# Patient Record
Sex: MALE | Race: WHITE | Employment: FULL TIME | URBAN - METROPOLITAN AREA
[De-identification: names, ages, dates, MRNs, and addresses within clinical notes are randomized per-mention and may not be internally consistent; named-entity substitution may affect disease eponyms.]

---

## 2020-01-31 ENCOUNTER — HOSPITAL ENCOUNTER (OUTPATIENT)
Dept: LAB | Age: 50
Discharge: HOME | End: 2020-01-31
Payer: COMMERCIAL

## 2020-01-31 LAB
ANION GAP SERPL CALC-SCNC: 5 MMOL/L (ref 3–18)
BUN SERPL-MCNC: 11 MG/DL (ref 7–18)
BUN/CREAT SERPL: 11 (ref 12–20)
CALCIUM SERPL-MCNC: 8.8 MG/DL (ref 8.5–10.1)
CHLORIDE SERPL-SCNC: 107 MMOL/L (ref 100–111)
CO2 SERPL-SCNC: 27 MMOL/L (ref 21–32)
CREAT SERPL-MCNC: 0.98 MG/DL (ref 0.6–1.3)
ERYTHROCYTE [DISTWIDTH] IN BLOOD BY AUTOMATED COUNT: 12.7 % (ref 11.6–14.5)
GLUCOSE SERPL-MCNC: 105 MG/DL (ref 74–99)
HCT VFR BLD AUTO: 45.7 % (ref 36–48)
HGB BLD-MCNC: 16.4 G/DL (ref 13–16)
MCH RBC QN AUTO: 34.7 PG (ref 24–34)
MCHC RBC AUTO-ENTMCNC: 35.9 G/DL (ref 31–37)
MCV RBC AUTO: 96.6 FL (ref 74–97)
PLATELET # BLD AUTO: 175 K/UL (ref 135–420)
PMV BLD AUTO: 10.5 FL (ref 9.2–11.8)
POTASSIUM SERPL-SCNC: 4.1 MMOL/L (ref 3.5–5.5)
RBC # BLD AUTO: 4.73 M/UL (ref 4.7–5.5)
SODIUM SERPL-SCNC: 139 MMOL/L (ref 136–145)
WBC # BLD AUTO: 6 K/UL (ref 4.6–13.2)

## 2020-01-31 PROCEDURE — 36415 COLL VENOUS BLD VENIPUNCTURE: CPT

## 2020-01-31 PROCEDURE — 85027 COMPLETE CBC AUTOMATED: CPT

## 2020-01-31 PROCEDURE — 80048 BASIC METABOLIC PNL TOTAL CA: CPT

## 2020-02-03 ENCOUNTER — HOSPITAL ENCOUNTER (OUTPATIENT)
Dept: LAB | Age: 50
Discharge: HOME | End: 2020-02-03
Payer: COMMERCIAL

## 2020-02-03 PROCEDURE — 88305 TISSUE EXAM BY PATHOLOGIST: CPT

## 2020-02-03 PROCEDURE — 88331 PATH CONSLTJ SURG 1 BLK 1SPC: CPT

## 2020-09-29 ENCOUNTER — OFFICE VISIT (OUTPATIENT)
Dept: ORTHOPEDIC SURGERY | Age: 50
End: 2020-09-29
Payer: COMMERCIAL

## 2020-09-29 ENCOUNTER — HOSPITAL ENCOUNTER (OUTPATIENT)
Dept: LAB | Age: 50
Discharge: HOME | End: 2020-09-29
Payer: COMMERCIAL

## 2020-09-29 VITALS
OXYGEN SATURATION: 99 % | SYSTOLIC BLOOD PRESSURE: 158 MMHG | HEART RATE: 84 BPM | HEIGHT: 74 IN | TEMPERATURE: 97.3 F | BODY MASS INDEX: 30.36 KG/M2 | WEIGHT: 236.6 LBS | DIASTOLIC BLOOD PRESSURE: 94 MMHG

## 2020-09-29 DIAGNOSIS — S91.332A PENETRATING WOUND OF LEFT FOOT, INITIAL ENCOUNTER: Primary | ICD-10-CM

## 2020-09-29 DIAGNOSIS — S91.332A PENETRATING WOUND OF LEFT FOOT, INITIAL ENCOUNTER: ICD-10-CM

## 2020-09-29 DIAGNOSIS — M79.672 BILATERAL FOOT PAIN: ICD-10-CM

## 2020-09-29 DIAGNOSIS — M79.671 BILATERAL FOOT PAIN: ICD-10-CM

## 2020-09-29 PROCEDURE — 87077 CULTURE AEROBIC IDENTIFY: CPT

## 2020-09-29 PROCEDURE — 73630 X-RAY EXAM OF FOOT: CPT | Performed by: ORTHOPAEDIC SURGERY

## 2020-09-29 PROCEDURE — 99213 OFFICE O/P EST LOW 20 MIN: CPT | Performed by: ORTHOPAEDIC SURGERY

## 2020-09-29 PROCEDURE — 87186 SC STD MICRODIL/AGAR DIL: CPT

## 2020-09-29 PROCEDURE — 11055 PARING/CUTG B9 HYPRKER LES 1: CPT | Performed by: ORTHOPAEDIC SURGERY

## 2020-09-29 PROCEDURE — 87147 CULTURE TYPE IMMUNOLOGIC: CPT

## 2020-09-29 PROCEDURE — 87205 SMEAR GRAM STAIN: CPT

## 2020-09-29 RX ORDER — IBUPROFEN 200 MG
TABLET ORAL
COMMUNITY
End: 2020-11-16

## 2020-09-29 RX ORDER — CIPROFLOXACIN 500 MG/1
500 TABLET ORAL 2 TIMES DAILY
Qty: 20 TAB | Refills: 0 | Status: SHIPPED | OUTPATIENT
Start: 2020-09-29 | End: 2020-10-09

## 2020-09-29 NOTE — LETTER
NOTIFICATION RETURN TO WORK / SCHOOL 
 
9/29/2020 2:58 PM 
 
Mr. Holli Antonio [facility] [id number] To Whom It May Concern: 
 
Holli Antonio is currently under the care of [address] [place] [address] Bryan Jules. He will be out of work until follow up visit in one week. If there are questions or concerns please have the patient contact our office.  
 
 
 
Sincerely, 
 
 
Neeraj Kunz MD

## 2020-09-29 NOTE — PATIENT INSTRUCTIONS
You have been provided with an order for durable medical equipment that you may  at an outside facility as our office does not carry the equipment you need. You may pick it up at any medical supply company you like. Listed below are a few different locations for your convenience: [facility] [address] [place] [address] Street Phone: [phone] Dermal Spray  Instructions for Use: Spray 2-3 times, blot affected area, then repeat. Telfa 4x4 dressings Ace wraps

## 2020-09-29 NOTE — PROGRESS NOTES
AMBULATORY PROGRESS NOTE      Patient: Marc Darden             MRN: [id number]     SSN: [id number] Body mass index is 30.38 kg/m². YOB: 1970     AGE: 44 y.o. EX: male    PCP: None       IMPRESSION //  DIAGNOSIS AND TREATMENT PLAN      DIAGNOSES    1. Penetrating wound of left foot, initial encounter    2. Bilateral foot pain        Orders Placed This Encounter    SHAV SKIN LES 6-10MM TRUNK,ARM,LEG    Generic Supply Order     hard sole shoe, left foot    Generic Supply Order     telfa  Dermal spray  Sterile 4X4    CULTURE, BODY FLUID W GRAM STAIN     Standing Status:   Future     Standing Expiration Date:   9/30/2021     Order Specific Question:   Tube Number:     Answer:   1    [21214] Foot Min 3V     Order Specific Question:   Weight bearing? Answer:   No    [41905] Foot Min 3V     Order Specific Question:   Weight bearing? Answer:   No    ibuprofen (AdviL) 200 mg tablet     Sig: Take  by mouth.  ciprofloxacin HCl (Cipro) 500 mg tablet     Sig: Take 1 Tab by mouth two (2) times a day for 10 days. Dispense:  20 Tab     Refill:  0          Plan is to do a gentle shaving procedure, to this wound that he has the left great toe. Cultures to be obtained as well    He does have a wound to the left great toe that measures 1 cm x 0.8 cm is on the plantar portion of the left great toe involves the epidermal layer. I see no pus. This was gently shaved, after obtaining written consent    Additional plans aside as below    3 views of the left foot, AP lateral and oblique x-rays of the left foot, shows postop changes from a left great toe IP joint disarticulation amputation surgery see no osteolytic or blastic lesions to suggest infection, and the bone, the left great toe appear to see no gas in the soft tissues of this left great toe. Three-view right foot: Tender over the right great toe IP joint seen metatarsus adductus, no acute fracture subluxation dislocations.   No osteolytic or blastic lesions seen on this three-view right foot. PLAN:    1. Patient received wound care treatment in the office. Telfa 4x4 dressings were placed. Also obtained culture. 2. DME order: hard sole shoe, left foot  3. Work Note: Please allow Mr. Marie Jimenez to remain out of work. He is currently under my care for wound care treatment of his great toe and will need to wear hard sole shoe for protection. 4. Rx for Dermal Spray, Telfa 4x4 dressings, and ace wraps at S  5. Antibiotic: Ciprofloxacin 5 mg 1 p.o. twice daily for 10 days. RTO-      HPI //  OBJECTIVE EXAMINATION      Peterson Wong IS A 44 y.o. male who presents to my outpatient office for evaluation of: left great toe pain. The patient reports that [place] when he noticed a wound at the amputation site. He admits to [place] extensive time in ocean [place], he attributes this most recent wound to being in the water while at the beach. He reports having difficulty with walking due to severe pain in his toe. The patient denies h/o of diabetes. This was that he developed, several weeks ago. He meets a spending extensive time the ocean water. Denies hitting his foot on anything,. He does notice a wound that developed. So sounds like may develop from a blister, but denies stepping on any clamshells or any foreign bodies. The patient works for [employer].      Visit Vitals  BP (!) 158/94   Pulse 84   Temp 97.3 °F (36.3 °C) (Temporal)   Ht 6' 2\" (1.88 m)   Wt 236 lb 9.6 oz (107.3 kg)   SpO2 99%   BMI 30.38 kg/m²       ANKLE/FOOT left    Psychiatry: Alert, oriented x 3 (name,place,time of day); speech normal in context and clarity, memory intact grossly, no involuntary movements - tremors, no dementia  Gait: normal  Tenderness: mild great toe distal phalanx  Cutaneous: elliptical wound to bottom of foot (0.8cm x 1 cm); denuded skin  Joint Motion: WNL  Joint / Tendon Stability: No Ankle or Subtalar instability or joint laxity. No peroneal sublux ability or dislocation  Alignment: neutral Hindfoot, none Metatarsus Adductus Metatarsus. Neuro Motor/Sensory: NL/NL,  Vascular: NL foot/ankle pulses,   Lymphatics: No extremity lymphedema, No calf swelling, no tenderness to calf muscles. CHART REVIEW     Patient Active Problem List   Diagnosis Code    Open wound of foot except toe(s) alone, without mention of complication N10.306M    Osteomyelitis (Rachell Ply) M86.9    Great toe amputation status [id number]        Barry Haas has been experiencing pain and discomfort confirmed as outlined in the pain assessment outlined below. Pain Assessment  9/29/2020   Location of Pain Foot   Pain Location Comment -   Location Modifiers Right;Left   Severity of Pain 6   Quality of Pain Sharp; Aching   Duration of Pain Persistent   Frequency of Pain Constant   Date Pain First Started (No Data)   Aggravating Factors Walking   Limiting Behavior Yes   Relieving Factors NSAID   Relieving Factors Comment -   Result of Injury No   Work-Related Injury -   Type of Injury -        Barry Haas  has no past medical history on file. Patients is employed at:         History reviewed. No pertinent past medical history. History reviewed. No pertinent surgical history. Current Outpatient Medications   Medication Sig    ciprofloxacin HCl (Cipro) 500 mg tablet Take 1 Tab by mouth two (2) times a day for 10 days.  ibuprofen (AdviL) 200 mg tablet Take  by mouth.  HYDROcodone-acetaminophen (NORCO) 5-325 mg per tablet Take 1-2 Tabs by mouth every eight (8) hours as needed for Pain. Max Daily Amount: 6 Tabs.  silver-foam bandage (AQUACEL AG FOAM) 1.2 %- 3.2\" X 3.2\" bndg 1 Each by Apply Externally route daily.  ondansetron (ZOFRAN ODT) 4 mg disintegrating tablet Take 1 Tab by mouth every eight (8) hours as needed for Nausea. No current facility-administered medications for this visit. Allergies   Allergen Reactions    Bactrim [Sulfamethoprim Ds] Rash     Social History     Occupational History    Not on file   Tobacco Use    Smoking status: Current Some Day Smoker    Smokeless tobacco: Never Used   Substance and Sexual Activity    Alcohol use: Yes    Drug use: No    Sexual activity: Not on file     Family History   Problem Relation Age of Onset    Cancer Father     Diabetes Brother     Heart Disease Other     Hypertension Neg Hx     Stroke Neg Hx        THE  FOR Krystyna Drake  WAS REVIEWED BY Marian Mcdowell MD 9/29/2020 . DIAGNOSTIC IMAGING  LAB DATA      No results found for: HBA1C, HGBE8, OMM1IXNS, TWV8EKVF //   Lab Results   Component Value Date/Time    Glucose 105 (H) 01/31/2020 11:52 AM        No results found for: FHP7UMFD, ERY8CNBJ      No results found for: VITD3, XQVID2, XQVID3, XQVID, VD3RIA, KTVF58DTWBE      REVIEW OF SYSTEMS : 9/29/2020  ALL BELOW ARE Negative except : SEE HPI     CONSTITUTIONAL: No weight loss  PSYCHOLOGICAL : No Feelings of anxiety, depression, agitation  EYES: No blurred vision and no eye discharge. NO eye pain, double vision  ENT: No nasal discharge. No ear pain. CARDIOVASCULAR: No chest pain and no diaphoresis. RESPIRATORY: No cough, no hemoptysis. GI: No vomiting, no diarrhea   : No urinary frequency and no dysuria. MUSCULOSKELETAL: see HPI  SKIN: No rashes. NEURO:  No dizziness,weakness, headaches// No visual changes or confusion, or seizures,   ENDOCRINE: No polyphagia and no polydipsia. HEMATOLOGY: No bleeding tendencies. DIAGNOSTIC IMAGING        3 views of the left foot, AP lateral and oblique x-rays of the left foot, shows postop changes from a left great toe IP joint disarticulation amputation surgery see no osteolytic or blastic lesions to suggest infection, and the bone, the left great toe appear to see no gas in the soft tissues of this left great toe.     Three-view right foot: Tender over the right great toe IP joint seen metatarsus adductus, no acute fracture subluxation dislocations. No osteolytic or blastic lesions seen on this three-view right foot. Please see above section of this report. I have personally reviewed the results of the above study. The interpretation of this study is my professional opinion. PROCEDURE: CALLOUS SHAVE NOTE          Will Trivedi provided verbal consent for a callous shaving procedure for 9/29/2020. The procedure was explained to the patient and possible adverse reactions were discussed. Risks and Benefits explained to the patient:included, but not limited to: bleeding, infection, local skin irritation. Patient and/or family questions answered      TIME OUT DONE (YES)    * Procedure site verified and marked as necessary  * Patient was positioned for comfort  * Verbal Informed Consent Verified by myself and my office staff. * TIME OUT performed immediately prior to start of procedure:    Peripheral skin callus: Callous ( 1 cm X .8 cm)  was carefully removed and trimmed with a sharp 15 blade from the:  Left foot -toe -plantar distal aspect without any complications. Betadine was placed, and cleansed of this year and a sharp excisional debridement of the dermal tissue using a sharp 15 blade. This was then cleansed with Betadine, and a dry dressing was placed: Telfa 4 x 4 Ace wrap: He is given supplies:    Cultures were obtained, prior to any cleaning and prepping procedure, of this wound    Will Trivedi tolerated the procedure well and was advised on the signs of infection and instructed to go to the ER or call the office if Will Trivedi becomes concerned about the area being infected. Patient received MIKE (After visit instructions).         Yudi Rae MD  9/29/2020  12:54 PM

## 2020-10-02 LAB
BACTERIA SPEC CULT: ABNORMAL
GRAM STN SPEC: ABNORMAL
SERVICE CMNT-IMP: ABNORMAL

## 2020-11-11 ENCOUNTER — TELEPHONE (OUTPATIENT)
Dept: ORTHOPEDIC SURGERY | Age: 50
End: 2020-11-11

## 2020-11-11 ENCOUNTER — HOSPITAL ENCOUNTER (OUTPATIENT)
Dept: LAB | Age: 50
Discharge: HOME | End: 2020-11-11
Payer: COMMERCIAL

## 2020-11-11 ENCOUNTER — OFFICE VISIT (OUTPATIENT)
Dept: ORTHOPEDIC SURGERY | Age: 50
End: 2020-11-11
Payer: COMMERCIAL

## 2020-11-11 VITALS
HEART RATE: 94 BPM | HEIGHT: 74 IN | SYSTOLIC BLOOD PRESSURE: 145 MMHG | TEMPERATURE: 97.3 F | WEIGHT: 231 LBS | RESPIRATION RATE: 16 BRPM | OXYGEN SATURATION: 99 % | BODY MASS INDEX: 29.65 KG/M2 | DIASTOLIC BLOOD PRESSURE: 99 MMHG

## 2020-11-11 DIAGNOSIS — S91.332A PENETRATING WOUND OF LEFT FOOT, INITIAL ENCOUNTER: ICD-10-CM

## 2020-11-11 DIAGNOSIS — S91.109A OPEN WOUND OF TOE, INITIAL ENCOUNTER: ICD-10-CM

## 2020-11-11 DIAGNOSIS — S91.332A PENETRATING WOUND OF LEFT FOOT, INITIAL ENCOUNTER: Primary | ICD-10-CM

## 2020-11-11 LAB
BASOPHILS # BLD: 0 K/UL (ref 0–0.1)
BASOPHILS NFR BLD: 0 % (ref 0–2)
CRP SERPL-MCNC: 6.3 MG/DL (ref 0–0.3)
DIFFERENTIAL METHOD BLD: ABNORMAL
EOSINOPHIL # BLD: 0.1 K/UL (ref 0–0.4)
EOSINOPHIL NFR BLD: 1 % (ref 0–5)
ERYTHROCYTE [DISTWIDTH] IN BLOOD BY AUTOMATED COUNT: 12.5 % (ref 11.6–14.5)
ERYTHROCYTE [SEDIMENTATION RATE] IN BLOOD: 28 MM/HR (ref 0–20)
HCT VFR BLD AUTO: 46.2 % (ref 36–48)
HGB BLD-MCNC: 16.6 G/DL (ref 13–16)
INR PPP: 1 (ref 0.8–1.2)
LYMPHOCYTES # BLD: 1.7 K/UL (ref 0.9–3.6)
LYMPHOCYTES NFR BLD: 26 % (ref 21–52)
MCH RBC QN AUTO: 35.6 PG (ref 24–34)
MCHC RBC AUTO-ENTMCNC: 35.9 G/DL (ref 31–37)
MCV RBC AUTO: 99.1 FL (ref 74–97)
MONOCYTES # BLD: 0.8 K/UL (ref 0.05–1.2)
MONOCYTES NFR BLD: 12 % (ref 3–10)
NEUTS SEG # BLD: 4.1 K/UL (ref 1.8–8)
NEUTS SEG NFR BLD: 61 % (ref 40–73)
PLATELET # BLD AUTO: 204 K/UL (ref 135–420)
PMV BLD AUTO: 10.3 FL (ref 9.2–11.8)
PROTHROMBIN TIME: 13.4 SEC (ref 11.5–15.2)
RBC # BLD AUTO: 4.66 M/UL (ref 4.7–5.5)
WBC # BLD AUTO: 6.6 K/UL (ref 4.6–13.2)

## 2020-11-11 PROCEDURE — 99214 OFFICE O/P EST MOD 30 MIN: CPT | Performed by: ORTHOPAEDIC SURGERY

## 2020-11-11 PROCEDURE — 85652 RBC SED RATE AUTOMATED: CPT

## 2020-11-11 PROCEDURE — 85610 PROTHROMBIN TIME: CPT

## 2020-11-11 PROCEDURE — 85025 COMPLETE CBC W/AUTO DIFF WBC: CPT

## 2020-11-11 PROCEDURE — 36415 COLL VENOUS BLD VENIPUNCTURE: CPT

## 2020-11-11 PROCEDURE — 86140 C-REACTIVE PROTEIN: CPT

## 2020-11-11 RX ORDER — CIPROFLOXACIN 750 MG/1
750 TABLET, FILM COATED ORAL 2 TIMES DAILY
Qty: 14 TAB | Refills: 1 | Status: SHIPPED | OUTPATIENT
Start: 2020-11-11 | End: 2020-11-20

## 2020-11-11 NOTE — PROGRESS NOTES
AMBULATORY PROGRESS NOTE      Patient: Lynda Vaughn             MRN: [id number]     SSN: [id number] Body mass index is 29.66 kg/m². YOB: 1970     AGE: 44 y.o. EX: male    PCP: None       IMPRESSION //  DIAGNOSIS AND TREATMENT PLAN      DIAGNOSES  1. Penetrating wound of left foot, initial encounter        Orders Placed This Encounter    Generic Supply Order     CAM walker boot    MRI FOOT LT WO CONT     Standing Status:   Future     Standing Expiration Date:   2/11/2021     Order Specific Question:   Region of foot     Answer: Fore    C REACTIVE PROTEIN, QT     Standing Status:   Future     Standing Expiration Date:   11/12/2021    CBC WITH AUTOMATED DIFF     Standing Status:   Future     Standing Expiration Date:   11/12/2021    SED RATE (ESR)     Standing Status:   Future     Standing Expiration Date:   11/12/2021    PROTHROMBIN TIME + INR     PT WITH INR     Standing Status:   Future     Standing Expiration Date:   11/18/2020        The patient the influenza, that he was not able to cough much time from work, and has been standing walking and working and had taken a bandage of his foot, to continue working. It is to be recalled, that the patient has a wound to the plantar aspect of his great toe, distal end of the proximal phalanx, where he is to be noted he is at his toe amputation at the distal phalanx IP region in the past.    He had cultures, of this wound, at the time when I saw him on 10/2/2020, is a superficial wound. And the cultures, grew back gram-positive cocci moderate Streptococcus, all sensitive to ciprofloxacin, which for which she is remained on. Today, he returns today, with a circular wound, the plantar portion of the great toe, that still has not healed. He states that he has not been able to stay off of it, and is what he needs to and tells me.     He states a hard soled shoe I gave him, was not he was unable to wear this comfortably so he did not wear it.    Evaluation today, he is a circular wound there is no pus there is some small dark particles, which I debrided, this was thoroughly cleansed with dermal spray. There is no pus, this was not cultured. Because of the long presence of this wound, and is poor healing, recommendation, is for stat MRI of the forefoot to see there is any signs of a concomitant osteomyelitis. I will keep him on the ciprofloxacin,. He understand that he may require surgical invention, if the end prolong antibiotics, if the MRI suggest osteomyelitis. Also to have a infection disease, see him on consultation as well. Heel pressure weightbearing, keep the foot as clean as as he can. PLAN:    1. Patient received wound care treatment in the office. Telfa 4x4 dressings were placed. Also obtained culture. 2. L foot MRI: r/o infection. Penetrating wound  3. Anticipate surgery to clean wound out if healing does not occur  4. Continue use of Antibiotic  5. DME order: CAM walker boot  6. Antibiotic: Ciprofloxacin 500 mg 1 p.o. twice daily for 10 days. 7.  Saline solution was given to him, he will keep this covered: Clean it once a day with saline, blotted dry with a sterile 4 x 4, place sterile 4 x 4's Ace wrap, to cover the great toe keep the toe clean and dry. Thereafter. RTO- after MRI      HPI //  OBJECTIVE EXAMINATION        Yaron Stern IS A 44 y.o. male who presents to my outpatient office for evaluation of: bilateral foot pain. Pt was last evaluated 9/2020 for penetrating wound of L foot and bilateral foot pain. Received wound care in the office. Telfa 4x4 dressings were placed. Also obtained culture. DME order: hard sole shoe, left foot. Given work note, Rx for Dermal spray and antibiotic. Pt states that he is unable to get his wound to heal and is nervous about what is going on.  He communicates that he continues to wear a wrapping around the wound and believes that is one reason it has not begun to heal. Pt reports not having any benefit with hard sole shoe given at last OV. He works at [employer]. Visit Vitals  BP (!) 145/99 (BP 1 Location: Left arm, BP Patient Position: Sitting)   Pulse 94   Temp 97.3 °F (36.3 °C)   Resp 16   Ht 6' 2\" (1.88 m)   Wt 231 lb (104.8 kg)   SpO2 99%   BMI 29.66 kg/m²       ANKLE/FOOT left    Psychiatry: Alert, oriented x 3 (name,place,time of day); speech normal in context and clarity, memory intact grossly, no involuntary movements - tremors, no dementia  Gait: normal  Tenderness: mild distal aspect plantar distal aspect for which there is a circular wound, that measures about 1 cm x 1 cm in width, and is about 6 to 8 mm in depth. See no exposed bone at this current time. Cutaneous: See above  Joint Motion: Decreased motion at the MTP region. Joint / Tendon Stability: No Ankle or Subtalar instability or joint laxity. No peroneal sublux ability or dislocation  Alignment: neutral Hindfoot  Neuro Motor/Sensory: NL/diminished in station light touch  Vascular: NL foot/ankle pulses,   Lymphatics: No extremity lymphedema, No calf swelling, no tenderness to calf muscles. CHART REVIEW     Patient Active Problem List   Diagnosis Code    Open wound of foot except toe(s) alone, without mention of complication X73.184V    Osteomyelitis ([facility] [address] M86.9    Great toe amputation status [id number]        Hailey Jeong has been experiencing pain and discomfort confirmed as outlined in the pain assessment outlined below.       Pain Assessment  11/11/2020   Location of Pain Toe   Pain Location Comment -   Location Modifiers Right   Severity of Pain 8   Quality of Pain Sharp   Duration of Pain Persistent   Frequency of Pain Constant   Date Pain First Started 9/1/2020   Aggravating Factors Walking;Standing   Limiting Behavior Yes   Relieving Factors NSAID   Relieving Factors Comment -   Result of Injury No   Work-Related Injury -   Type of Injury - Nataliia Douglass  has no past medical history on file. Patients is employed at:         History reviewed. No pertinent past medical history. History reviewed. No pertinent surgical history. Current Outpatient Medications   Medication Sig    ibuprofen (AdviL) 200 mg tablet Take  by mouth.  silver-foam bandage (AQUACEL AG FOAM) 1.2 %- 3.2\" X 3.2\" bndg 1 Each by Apply Externally route daily.  HYDROcodone-acetaminophen (NORCO) 5-325 mg per tablet Take 1-2 Tabs by mouth every eight (8) hours as needed for Pain. Max Daily Amount: 6 Tabs.  ondansetron (ZOFRAN ODT) 4 mg disintegrating tablet Take 1 Tab by mouth every eight (8) hours as needed for Nausea. No current facility-administered medications for this visit. Allergies   Allergen Reactions    Bactrim [Sulfamethoprim Ds] Rash     Social History     Occupational History    Not on file   Tobacco Use    Smoking status: Current Some Day Smoker    Smokeless tobacco: Never Used   Substance and Sexual Activity    Alcohol use: Yes    Drug use: No    Sexual activity: Not on file     Family History   Problem Relation Age of Onset    Cancer Father     Diabetes Brother     Heart Disease Other     Hypertension Neg Hx     Stroke Neg Hx        THE  FOR Nataliia Douglass  WAS REVIEWED BY Blaire Clay MD 11/11/2020 . DIAGNOSTIC IMAGING  LAB DATA      No results found for: HBA1C, HGBE8, OJX9ZOLP, ETH0GOHD //   Lab Results   Component Value Date/Time    Glucose 105 (H) 01/31/2020 11:52 AM        No results found for: FBR0VZAM, WAL0QOIF      No results found for: VITD3, XQVID2, XQVID3, XQVID, VD3RIA, WUFQ13CZDPV      REVIEW OF SYSTEMS : 11/11/2020  ALL BELOW ARE Negative except : SEE HPI     CONSTITUTIONAL: No weight loss  PSYCHOLOGICAL : No Feelings of anxiety, depression, agitation  EYES: No blurred vision and no eye discharge. NO eye pain, double vision  ENT: No nasal discharge. No ear pain. CARDIOVASCULAR: No chest pain and no diaphoresis. RESPIRATORY: No cough, no hemoptysis. GI: No vomiting, no diarrhea   : No urinary frequency and no dysuria. MUSCULOSKELETAL: see HPI  SKIN: No rashes. NEURO:  No dizziness,weakness, headaches// No visual changes or confusion, or seizures,   ENDOCRINE: No polyphagia and no polydipsia. HEMATOLOGY: No bleeding tendencies. DIAGNOSTIC IMAGING      Please see above section of this report. I have personally reviewed the results of the above study. The interpretation of this study is my professional opinion.       Keshia Ahuja MD  11/11/2020  8:08 AM

## 2020-11-11 NOTE — TELEPHONE ENCOUNTER
I called patient to advise him that his STAT MRI of left foot was scheduled at [facility] on 11/12/20 arrival of 6:15am test at 6:30am. Patient declined that appt as he has to work. Patient was then advised that THE [facility] had 2 for tomorrow 10:30am or 2:30pm and [facility]/ [facility] loc did not have anything for 2 weeks. Patient then stated \"I work until 3pm and cant do these appts. \" then  \"I  thought things were already in the works and that I wouldn't have to wait that long for an MRI, I have been here for 2 hrs waiting on bloodwork\" I again advised him that the [facility] appt was the first available for all  locations. Patient continued to say he couldn't do these so he was then offered to get sent to [facility] or [address] [place] [address]. He stated that was fine to see what they have. I am faxing patients MRI order, demographics and office note to [facility] [phone]  Tel# [phone]  Requesting an appt by Friday with wet read so he can be seen back with Dr Sergio Argueta on Monday 11/16/20.

## 2020-11-12 ENCOUNTER — HOSPITAL ENCOUNTER (OUTPATIENT)
Age: 50
Discharge: HOME | End: 2020-11-12
Attending: ORTHOPAEDIC SURGERY
Payer: COMMERCIAL

## 2020-11-12 PROCEDURE — 73718 MRI LOWER EXTREMITY W/O DYE: CPT

## 2020-11-12 NOTE — TELEPHONE ENCOUNTER
Dr. London Saravia contacted the patient at the work number provided. He informed the patient of his lab findings and advised the patient that he will need to have the MRI completed as soon as possible as Dr. London Saravia is trying to save his toe. Patient needs to have the MRI completed prior to his next office visit. He informed the patient he should not be working at this time or walking on the foot. Patient is willing to have the MRI completed. Please call the patient to reschedule the MRI. Citlalli Goodson [facility], MPA, PA-C  11/12/2020  11:19 AM

## 2020-11-12 NOTE — TELEPHONE ENCOUNTER
I called patient on his work number advised him that he is schedule for 11/12/20 @ [facility] must arrive at 8:00pm through the ED as it is after 5pm. Test begins at 8:30pm. No metal, jewelry bring Id, ins card, mask. Patient verbally stated that he understood.

## 2020-11-12 NOTE — TELEPHONE ENCOUNTER
Please see earlier response in this encounter:    Patient reports Lissa called to schedule his STAT MRI for 6 a.m. this morning, but he declined this MRI as well, due to employment obligations. Patient has declined several MRI appts for one reason or the other. Patient reports he's had his labs done at [facility]. Pt has a f/u for Monday for labs and mri review. Please advise the patient if this appointment should remain or be rescheduled.     Patient can be contacted at his work# at [phone]

## 2020-11-13 ENCOUNTER — DOCUMENTATION ONLY (OUTPATIENT)
Dept: ORTHOPEDIC SURGERY | Age: 50
End: 2020-11-13

## 2020-11-13 DIAGNOSIS — S91.109A OPEN WOUND OF TOE, INITIAL ENCOUNTER: Primary | ICD-10-CM

## 2020-11-13 DIAGNOSIS — M86.072 ACUTE HEMATOGENOUS OSTEOMYELITIS OF LEFT FOOT (HCC): ICD-10-CM

## 2020-11-13 NOTE — PROGRESS NOTES
I spoke with him re his MRI. MRI Results (most recent):  Results from Orders Only encounter on 11/11/20   MRI FOOT LT WO CONT    Narrative EXAM: MRI of the Left  foot     INDICATION: Right toe wound    TECHNIQUE: Multiplanar multisequence MR imaging of the left  foot    IV Contrast: None    COMPARISON: None    FINDINGS:   Osseous Structures: There is a plantar-based ulcer in the distal aspect of the  residual right first toe. There is a osteomyelitis of the majority of the distal  half of the proximal phalanx of the first toe. The distal phalanx previously had  been resected. The remainder the osseous structures are unremarkable. No  evidence of septic arthritis. Sesamoids are present and unremarkable. Ligaments: No gross ligamentous pathology appreciated. Tendons/Muscle: Likely denervation changes with atrophy of the foot consistent  with diabetes. Soft Tissues/Other:  As mentioned previously, there is an ulcer in the plantar  aspect of the first toe. No discrete fluid collection identified. Impression IMPRESSION:   1. Osteomyelitis of the first proximal phalanx. He will need amputation of the LEFT great toe MTP region. Partial and Complete.     SURGERY PLANNED    Mayola Soulier  1970  [id number]    Today's date: 11/13/2020       PLAN FOR SURGERY NEXT WEEK    Diagnosis: OSTEOMYELITIS LEFT GREAT TOE DISTAL END PROXIMAL PHALANX  Procedure:  AMPUTATION LEFT GREAT TOE PROXIMAL PHALANX  Location: [facility]  Pre op labs:    CMP: No results found for: NA, K, CL, CO2, AGAP, GLU, BUN, CREA, GFRAA, GFRNA, CA, MG, PHOS, ALB, TBIL, TP, ALB, GLOB, AGRAT, ALT  CBC: No results found for: WBC, HGB, HGBEXT, HCT, HCTEXT, PLT, PLTEXT, HGBEXT, HCTEXT, PLTEXT  COAGS: No results found for: APTT, PTP, INR, INREXT, INREXT   Anesthesia: GENERAL  Patient position:  SUPINE  Clearance is required: NONE  Orthopedic equipment:  KNIFE/SAW BLADE  Additional instrumentation: Levi Bucio MD  11/13/2020  5:36 PM

## 2020-11-13 NOTE — PROGRESS NOTES
Nerissa Hutchinson   [id number]  [id number]    Diagnosis: OSTEOMYELITIS LEFT GREAT TOE DISTAL END PROXIMAL PHALANX  Procedure:  AMPUTATION LEFT GREAT TOE PROXIMAL PHALANX  Location: [facility]  Pre op labs:   LABS WERE ALREADY DONE     Anesthesia: GENERAL  Patient position:  SUPINE  Clearance is required: NONE  Orthopedic equipment:  KNIFE/SAW BLADE  Additional instrumentation: Levi Oliver MD  11/13/2020  5:42 PM

## 2020-11-16 ENCOUNTER — OFFICE VISIT (OUTPATIENT)
Dept: ORTHOPEDIC SURGERY | Age: 50
End: 2020-11-16
Payer: COMMERCIAL

## 2020-11-16 ENCOUNTER — ANESTHESIA EVENT (OUTPATIENT)
Dept: SURGERY | Age: 50
DRG: 504 | End: 2020-11-16
Payer: COMMERCIAL

## 2020-11-16 ENCOUNTER — HOSPITAL ENCOUNTER (OUTPATIENT)
Dept: LAB | Age: 50
Discharge: HOME | DRG: 504 | End: 2020-11-16
Payer: COMMERCIAL

## 2020-11-16 ENCOUNTER — HOSPITAL ENCOUNTER (OUTPATIENT)
Dept: GENERAL RADIOLOGY | Age: 50
Discharge: HOME | DRG: 504 | End: 2020-11-16
Payer: COMMERCIAL

## 2020-11-16 VITALS
DIASTOLIC BLOOD PRESSURE: 99 MMHG | WEIGHT: 230 LBS | RESPIRATION RATE: 16 BRPM | OXYGEN SATURATION: 99 % | TEMPERATURE: 97.3 F | HEIGHT: 74 IN | BODY MASS INDEX: 29.52 KG/M2 | HEART RATE: 84 BPM | SYSTOLIC BLOOD PRESSURE: 154 MMHG

## 2020-11-16 DIAGNOSIS — Z01.818 PRE-OPERATIVE EXAMINATION: ICD-10-CM

## 2020-11-16 DIAGNOSIS — M86.672 OTHER CHRONIC OSTEOMYELITIS OF LEFT FOOT (HCC): ICD-10-CM

## 2020-11-16 DIAGNOSIS — M86.672 OTHER CHRONIC OSTEOMYELITIS OF LEFT FOOT (HCC): Primary | ICD-10-CM

## 2020-11-16 LAB
ALBUMIN SERPL-MCNC: 3.5 G/DL (ref 3.4–5)
ALBUMIN/GLOB SERPL: 0.9 {RATIO} (ref 0.8–1.7)
ALP SERPL-CCNC: 102 U/L (ref 45–117)
ALT SERPL-CCNC: 38 U/L (ref 16–61)
ANION GAP SERPL CALC-SCNC: 9 MMOL/L (ref 3–18)
AST SERPL-CCNC: 25 U/L (ref 10–38)
ATRIAL RATE: 84 BPM
BILIRUB SERPL-MCNC: 1.1 MG/DL (ref 0.2–1)
BUN SERPL-MCNC: 13 MG/DL (ref 7–18)
BUN/CREAT SERPL: 12 (ref 12–20)
CALCIUM SERPL-MCNC: 8.8 MG/DL (ref 8.5–10.1)
CALCULATED P AXIS, ECG09: 32 DEGREES
CALCULATED R AXIS, ECG10: -7 DEGREES
CALCULATED T AXIS, ECG11: 30 DEGREES
CHLORIDE SERPL-SCNC: 105 MMOL/L (ref 100–111)
CO2 SERPL-SCNC: 26 MMOL/L (ref 21–32)
CREAT SERPL-MCNC: 1.08 MG/DL (ref 0.6–1.3)
DIAGNOSIS, 93000: NORMAL
EST. AVERAGE GLUCOSE BLD GHB EST-MCNC: 97 MG/DL
GLOBULIN SER CALC-MCNC: 4 G/DL (ref 2–4)
GLUCOSE SERPL-MCNC: 128 MG/DL (ref 74–99)
HBA1C MFR BLD: 5 % (ref 4.2–5.6)
P-R INTERVAL, ECG05: 152 MS
POTASSIUM SERPL-SCNC: 4 MMOL/L (ref 3.5–5.5)
PROT SERPL-MCNC: 7.5 G/DL (ref 6.4–8.2)
Q-T INTERVAL, ECG07: 366 MS
QRS DURATION, ECG06: 86 MS
QTC CALCULATION (BEZET), ECG08: 432 MS
SODIUM SERPL-SCNC: 140 MMOL/L (ref 136–145)
VENTRICULAR RATE, ECG03: 84 BPM

## 2020-11-16 PROCEDURE — 93005 ELECTROCARDIOGRAM TRACING: CPT

## 2020-11-16 PROCEDURE — 71046 X-RAY EXAM CHEST 2 VIEWS: CPT

## 2020-11-16 PROCEDURE — 36415 COLL VENOUS BLD VENIPUNCTURE: CPT

## 2020-11-16 PROCEDURE — 99214 OFFICE O/P EST MOD 30 MIN: CPT | Performed by: ORTHOPAEDIC SURGERY

## 2020-11-16 PROCEDURE — 87635 SARS-COV-2 COVID-19 AMP PRB: CPT

## 2020-11-16 PROCEDURE — 80053 COMPREHEN METABOLIC PANEL: CPT

## 2020-11-16 PROCEDURE — 83036 HEMOGLOBIN GLYCOSYLATED A1C: CPT

## 2020-11-16 RX ORDER — POLYETHYLENE GLYCOL 3350 17 G/17G
17 POWDER, FOR SOLUTION ORAL DAILY
Qty: 10 PACKET | Refills: 1 | Status: SHIPPED | OUTPATIENT
Start: 2020-11-16

## 2020-11-16 RX ORDER — HYDROCODONE BITARTRATE AND ACETAMINOPHEN 7.5; 325 MG/1; MG/1
1-2 TABLET ORAL
Qty: 42 TAB | Refills: 0 | Status: SHIPPED | OUTPATIENT
Start: 2020-11-16 | End: 2020-11-23

## 2020-11-16 RX ORDER — ASPIRIN 325 MG
325 TABLET ORAL
Qty: 60 TAB | Refills: 1 | Status: SHIPPED | OUTPATIENT
Start: 2020-11-16

## 2020-11-16 RX ORDER — ONDANSETRON 4 MG/1
4 TABLET, ORALLY DISINTEGRATING ORAL
Qty: 30 TAB | Refills: 0 | Status: SHIPPED | OUTPATIENT
Start: 2020-11-16

## 2020-11-16 NOTE — PROGRESS NOTES
AMBULATORY PROGRESS NOTE      Patient: Liliana Briceno             MRN: [id number]     SSN: [id number] Body mass index is 29.53 kg/m². YOB: 1970     AGE: 44 y.o. EX: male    PCP: None       IMPRESSION //  DIAGNOSIS AND TREATMENT PLAN      DIAGNOSES  1. Other chronic osteomyelitis of left foot ([facility] [address]    2. Pre-operative examination        Orders Placed This Encounter    XR CHEST PA LAT     Standing Status:   Future     Number of Occurrences:   1     Standing Expiration Date:   5/21/2021     Scheduling Instructions:      Please recheck to confirm if:      1. Patient has Allergry to IV contrast dye      2. Patient is pregnant     Order Specific Question:   Reason for Exam     Answer:   Preop Risk stratificatiion    NOVEL CORONAVIRUS (COVID-19)     Standing Status:   Future     Number of Occurrences:   1     Standing Expiration Date:   11/16/2021     Scheduling Instructions:      1) Due to current limited availability of the COVID-19 PCR test, tests will be prioritized and may not be completed.              2) Order only if the test result will change clinical management or necessary for a return to mission-critical employment decision.              3) Print and instruct patient to adhere to CDC home isolation program. (Link Above)              4) Set up or refer patient for a monitoring program.              5) Have patient sign up for and leverage [employer] (if not previously done). Order Specific Question:   Is this test for diagnosis or screening? Answer:   Screening     Order Specific Question:   Symptomatic for COVID-19 as defined by CDC? Answer:   No     Order Specific Question:   Hospitalized for COVID-19? Answer:   No     Order Specific Question:   Admitted to ICU for COVID-19? Answer:   Unknown     Order Specific Question:   Employed in healthcare setting? Answer:   Unknown     Order Specific Question:   Resident in a congregate (group) care setting?      Answer: Unknown     Order Specific Question:   Previously tested for COVID-19? Answer:   Unknown    METABOLIC PANEL, COMPREHENSIVE     Standing Status:   Future     Number of Occurrences:   1     Standing Expiration Date:   11/17/2021    HEMOGLOBIN A1C WITH EAG     Standing Status:   Future     Number of Occurrences:   1     Standing Expiration Date:   11/17/2021    EKG, 12 LEAD, SUBSEQUENT     preop     Standing Status:   Future     Number of Occurrences:   1     Standing Expiration Date:   5/17/2021     Order Specific Question:   Reason for Exam:     Answer:   risk stratify    HYDROcodone-acetaminophen (NORCO) 7.5-325 mg per tablet     Sig: Take 1-2 Tabs by mouth every eight (8) hours as needed for Pain (AFTER SURGERY, NOT BEFORE) for up to 7 days. Max Daily Amount: 6 Tabs. Dispense:  42 Tab     Refill:  0    ondansetron (ZOFRAN ODT) 4 mg disintegrating tablet     Sig: Take 1 Tab by mouth every eight (8) hours as needed for Nausea or Vomiting. Indications: prevent nausea and vomiting after surgery     Dispense:  30 Tab     Refill:  0    aspirin (ASPIRIN) 325 mg tablet     Sig: Take 1 Tab by mouth two (2) times daily (after meals). Dispense:  60 Tab     Refill:  1    polyethylene glycol (Miralax) 17 gram packet     Sig: Take 1 Packet by mouth daily. Dispense:  10 Packet     Refill:  1        He has osteomyelitis, of the great toe, of the left great toe proximal phalanx. MRI reports this in a separate note from today. Because of his persistent wound, and inability to heal this, recommendations for amputation of left great toe really through the MTP or joint articulation site. Options would be a partial amputation of this  LEFT great toe to the proximal phalanx half of that, leaving residual bone for anticipated better balance, by little bit better biomechanics, but other wise he would probably need more prolonged antibiotics, should not leave any residual bone in this great toe phalanx.   Next chief complaint, is worsening pain in the right great toe now point to the MTP region denies any fever shakes chills night sweats. My overall plan would be for her LEFT great toe amputation again more than likely through the MTP joint, as well as this will be a MTP joint disarticulation amputation. Anticipate cultures, 23 hours observation    PLAN:     Amputation, great toe, left great toe first MTP region: Surgery planned for tomorrow, November 17, 2020      HPI //  OBJECTIVE EXAMINATION        Matilde Levin IS A 44 y.o. male who presents to my outpatient office for evaluation of:    Osteomyelitis LEFT great toe, continued pain discomfort, right great toe. its to having this wound, for several weeks now, over 6 weeks now, he states \"this is all my fault\". Next, he has continued wound, and and osteomyelitis, conditions operative intervention    Visit Vitals  BP (!) 154/99 (BP 1 Location: Right arm, BP Patient Position: Sitting)   Pulse 84   Temp 97.3 °F (36.3 °C) (Temporal)   Resp 16   Ht 6' 2\" (1.88 m)   Wt 230 lb (104.3 kg)   SpO2 99%   BMI 29.53 kg/m²       ANKLE/FOOT LEFT    Psychiatry: Alert, oriented x 3 (name,place,time of day); speech normal in context and clarity, memory intact grossly, no involuntary movements - tremors, no dementia  Gait: slow  Tenderness: moderate  LEFT great toe   Cutaneous: There are some swelling, redness to the dorsal part of the LEFT great toe, there is a wound, to the plantar lateral portion of the distal end of thisLEFT great toe, where he had this IP joint disarticulation amputation surgery many years ago  Joint Motion: Diminished range of motion, great toe MTP  Joint / Tendon Stability: No Ankle or Subtalar instability or joint laxity.                        No peroneal sublux ability or dislocation  Alignment: neutral Hindfoot  Neuro Motor/Sensory: NL/diminished incision monofilament testing, plantar forefoot  Vascular: NL foot/ankle pulses,   Lymphatics: No extremity lymphedema, No calf swelling, no tenderness to calf muscles. CHART REVIEW     Patient Active Problem List   Diagnosis Code    Open wound of foot except toe(s) alone, without mention of complication U96.137K    Osteomyelitis ([facility] [address] M86.9    Great toe amputation status [id number]        Parminder Sailnas has been experiencing pain and discomfort confirmed as outlined in the pain assessment outlined below. Pain Assessment  11/16/2020   Location of Pain Foot   Pain Location Comment -   Location Modifiers Left   Severity of Pain 8   Quality of Pain Throbbing   Quality of Pain Comment swelling   Duration of Pain Persistent   Frequency of Pain Constant   Date Pain First Started -   Aggravating Factors Stairs;Standing;Walking;Bending   Limiting Behavior -   Relieving Factors Nothing   Relieving Factors Comment -   Result of Injury No   Work-Related Injury -   Type of Injury -        Parminder Salinas  has a past medical history of Left toe amputee ([facility] [address] Patients is employed at:         Past Medical History:   Diagnosis Date    Left toe amputee [facility])      Past Surgical History:   Procedure Laterality Date    FOOT/TOES SURGERY PROC UNLISTED      HX HEENT  02/2020    Lip excision (lower)     Current Outpatient Medications   Medication Sig    HYDROcodone-acetaminophen (NORCO) 7.5-325 mg per tablet Take 1-2 Tabs by mouth every eight (8) hours as needed for Pain (AFTER SURGERY, NOT BEFORE) for up to 7 days. Max Daily Amount: 6 Tabs.  ondansetron (ZOFRAN ODT) 4 mg disintegrating tablet Take 1 Tab by mouth every eight (8) hours as needed for Nausea or Vomiting. Indications: prevent nausea and vomiting after surgery    aspirin (ASPIRIN) 325 mg tablet Take 1 Tab by mouth two (2) times daily (after meals).  polyethylene glycol (Miralax) 17 gram packet Take 1 Packet by mouth daily.  ciprofloxacin HCl (CIPRO) 750 mg tablet Take 1 Tab by mouth two (2) times a day.     silver-foam bandage (AQUACEL AG FOAM) 1.2 %- 3.2\" X 3.2\" bndg 1 Each by Apply Externally route daily. No current facility-administered medications for this visit. Allergies   Allergen Reactions    Bactrim [Sulfamethoprim Ds] Rash     Social History     Occupational History    Not on file   Tobacco Use    Smoking status: Former Smoker    Smokeless tobacco: Never Used   Substance and Sexual Activity    Alcohol use: Yes     Comment: social    Drug use: Not Currently     Types: Marijuana     Comment: last 2005    Sexual activity: Not on file     Family History   Problem Relation Age of Onset    Cancer Father     Diabetes Brother     Heart Disease Other     Hypertension Neg Hx     Stroke Neg Hx        THE  FOR Iwona Amado  WAS REVIEWED BY Radha Lindsey MD 11/16/2020 . DIAGNOSTIC IMAGING  LAB DATA      No results found for: HBA1C, HGBE8, UDY8ZNOM, XGP0ZCCX //   Lab Results   Component Value Date/Time    Glucose 105 (H) 01/31/2020 11:52 AM        No results found for: ZCL6RSPF, HVO9CMMB      No results found for: VITD3, XQVID2, XQVID3, XQVID, VD3RIA, JBOM45QZZXM      REVIEW OF SYSTEMS : 11/16/2020  ALL BELOW ARE Negative except : SEE HPI     CONSTITUTIONAL: No weight loss  PSYCHOLOGICAL : No Feelings of anxiety, depression, agitation  EYES: No blurred vision and no eye discharge. NO eye pain, double vision  ENT: No nasal discharge. No ear pain. CARDIOVASCULAR: No chest pain and no diaphoresis. RESPIRATORY: No cough, no hemoptysis. GI: No vomiting, no diarrhea   : No urinary frequency and no dysuria. MUSCULOSKELETAL: see HPI  SKIN: No rashes. NEURO:  No dizziness,weakness, headaches// No visual changes or confusion, or seizures,   ENDOCRINE: No polyphagia and no polydipsia. HEMATOLOGY: No bleeding tendencies. DIAGNOSTIC IMAGING      Please see above section of this report. I have personally reviewed the results of the above study. The interpretation of this study is my professional opinion.       Radha Lindsey MD  11/16/2020  1:16 PM

## 2020-11-16 NOTE — PROGRESS NOTES
DIAGNOSTIC IMAGING         MRI Results (most recent):  Results from Orders Only encounter on 11/11/20   MRI FOOT LT WO CONT    Narrative EXAM: MRI of the Left  foot     INDICATION: Right toe wound    TECHNIQUE: Multiplanar multisequence MR imaging of the left  foot    IV Contrast: None    COMPARISON: None    FINDINGS:   Osseous Structures: There is a plantar-based ulcer in the distal aspect of the  residual right first toe. There is a osteomyelitis of the majority of the distal  half of the proximal phalanx of the first toe. The distal phalanx previously had  been resected. The remainder the osseous structures are unremarkable. No  evidence of septic arthritis. Sesamoids are present and unremarkable. Ligaments: No gross ligamentous pathology appreciated. Tendons/Muscle: Likely denervation changes with atrophy of the foot consistent  with diabetes. Soft Tissues/Other:  As mentioned previously, there is an ulcer in the plantar  aspect of the first toe. No discrete fluid collection identified. Impression IMPRESSION:   1. Osteomyelitis of the first proximal phalanx.                           Roman English MD  11/16/2020  12:53 PM

## 2020-11-16 NOTE — H&P (VIEW-ONLY)
FOOT AND ANKLE HISTORY AND PHYSICAL Patient: Juan F Cobb                   MRN: [id number]         SSN: [id number] YOB: 1970            AGE: 44 y.o. SEX: male Patient scheduled for:  Amputation of left great toe; irrigation and debridement; cultures Date of surgery: 11/17/20 [facility]: Laurel Devries MD 
ANESTHESIA TYPE:  General  
  
    
 
 
 
 
 
ORDERS PLACED DURING H&P: 
 
Orders Placed This Encounter  XR CHEST PA LAT Standing Status:   Future Standing Expiration Date:   5/21/2021 Scheduling Instructions:  
   Please recheck to confirm if: 1. Patient has Allergry to IV contrast dye 2. Patient is pregnant Order Specific Question:   Reason for Exam  
  Answer:   Preop Risk stratificatiion  NOVEL CORONAVIRUS (COVID-19) Standing Status:   Future Standing Expiration Date:   11/16/2021 Scheduling Instructions:  
   1) Due to current limited availability of the COVID-19 PCR test, tests will be prioritized and may not be completed.    
     
   2) Order only if the test result will change clinical management or necessary for a return to mission-critical employment decision.    
     
   3) Print and instruct patient to adhere to CDC home isolation program. (Link Above)    
     
   4) Set up or refer patient for a monitoring program.    
     
   5) Have patient sign up for and leverage [employer] (if not previously done). Order Specific Question:   Is this test for diagnosis or screening? Answer:   Screening Order Specific Question:   Symptomatic for COVID-19 as defined by CDC? Answer:   No  
  Order Specific Question:   Hospitalized for COVID-19? Answer:   No  
  Order Specific Question:   Admitted to ICU for COVID-19? Answer:   Unknown Order Specific Question:   Employed in healthcare setting? Answer:   Unknown Order Specific Question:   Resident in a congregate (group) care setting? Answer:   Unknown Order Specific Question:   Previously tested for COVID-19? Answer:   Unknown  METABOLIC PANEL, COMPREHENSIVE Standing Status:   Future Standing Expiration Date:   11/17/2021  
 HEMOGLOBIN A1C WITH EAG Standing Status:   Future Standing Expiration Date:   11/17/2021  EKG, 12 LEAD, SUBSEQUENT  
  preop Standing Status:   Future Standing Expiration Date:   5/17/2021 Order Specific Question:   Reason for Exam: Answer:   risk stratify  HYDROcodone-acetaminophen (NORCO) 7.5-325 mg per tablet Sig: Take 1-2 Tabs by mouth every eight (8) hours as needed for Pain (AFTER SURGERY, NOT BEFORE) for up to 7 days. Max Daily Amount: 6 Tabs. Dispense:  42 Tab Refill:  0  
 ondansetron (ZOFRAN ODT) 4 mg disintegrating tablet Sig: Take 1 Tab by mouth every eight (8) hours as needed for Nausea or Vomiting. Indications: prevent nausea and vomiting after surgery Dispense:  30 Tab Refill:  0  
 aspirin (ASPIRIN) 325 mg tablet Sig: Take 1 Tab by mouth two (2) times daily (after meals). Dispense:  60 Tab Refill:  1  
 polyethylene glycol (Miralax) 17 gram packet Sig: Take 1 Packet by mouth daily. Dispense:  10 Packet Refill:  1 Post Operative Prescriptions have  been e-scribed during the H&P HISTORY:  
 
The patient was seen in the office today for a preoperative history and physical for an upcoming above listed surgery. The patient is a pleasant 44 y.o. male who has a history of  osteomyelitis, of the great toe, of the left great toe proximal phalanx. MRI reports this in a separate note from today.   Because of his persistent wound, and inability to heal this, recommendations for amputation of left great toe really through the MTP or joint articulation site. 
  
 Options would be a partial amputation of this  LEFT great toe to the proximal phalanx half of that, leaving residual bone for anticipated better balance, by little bit better biomechanics, but other wise he would probably need more prolonged antibiotics, should not leave any residual bone in this great toe phalanx. Next chief complaint, is worsening pain in the right great toe now point to the MTP region denies any fever shakes chills night sweats. 
  
My overall plan would be for her LEFT great toe amputation again more than likely through the MTP joint, as well as this will be a MTP joint disarticulation amputation. 
  
Due to the current findings, affected activity of daily living and continued pain and discomfort, surgical intervention is indicated. The alternatives, risks, and complications, including but not limited to infection, blood loss, need for blood transfusion, neurovascular damage, al-incisional numbness, subcutaneous hematoma, bone fracture, anesthetic complications, DVT, PE, death, RSD, postoperative stiffness and pain, possible surgical scar, delayed healing and nonhealing, reflexive sympathetic dystrophy, damage to blood vessels and nerves, need for more surgery, MI, and stroke, failure of hardware, gait disturbances  have been discussed. The patient understands and wishes to proceed with surgery. The patient was counseled at length about the risks of polo Covid-19 during their perioperative period and any recovery window from their procedure. The patient was made aware that polo Covid-19  may worsen their prognosis for recovering from their procedure and lend to a higher morbidity and/or mortality risk. All material risks, benefits, and reasonable alternatives including postponing the procedure were discussed. The patient does wish to proceed with the procedure at this time. PAST MEDICAL HISTORY:  
 
Past Medical History:  
Diagnosis Date  Left toe amputee ([facility] [address] CURRENT MEDICATIONS:  
 
Current Outpatient Medications Medication Sig Dispense Refill  
 HYDROcodone-acetaminophen (NORCO) 7.5-325 mg per tablet Take 1-2 Tabs by mouth every eight (8) hours as needed for Pain (AFTER SURGERY, NOT BEFORE) for up to 7 days. Max Daily Amount: 6 Tabs. 42 Tab 0  
 ondansetron (ZOFRAN ODT) 4 mg disintegrating tablet Take 1 Tab by mouth every eight (8) hours as needed for Nausea or Vomiting. Indications: prevent nausea and vomiting after surgery 30 Tab 0  
 aspirin (ASPIRIN) 325 mg tablet Take 1 Tab by mouth two (2) times daily (after meals). 60 Tab 1  polyethylene glycol (Miralax) 17 gram packet Take 1 Packet by mouth daily. 10 Packet 1  ciprofloxacin HCl (CIPRO) 750 mg tablet Take 1 Tab by mouth two (2) times a day. 14 Tab 1  
 silver-foam bandage (AQUACEL AG FOAM) 1.2 %- 3.2\" X 3.2\" bndg 1 Each by Apply Externally route daily. 1 Box 0 ALLERGIES:  
 
Allergies Allergen Reactions  Bactrim [Sulfamethoprim Ds] Rash SURGICAL HISTORY:  
 
Past Surgical History:  
Procedure Laterality Date  FOOT/TOES SURGERY PROC UNLISTED    
 
 
SOCIAL HISTORY:  
 
Social History Socioeconomic History  Marital status:  Spouse name: Not on file  Number of children: Not on file  Years of education: Not on file  Highest education level: Not on file Tobacco Use  Smoking status: Current Some Day Smoker  Smokeless tobacco: Never Used Substance and Sexual Activity  Alcohol use: Yes Comment: social  
 Drug use: No  
 
 
FAMILY HISTORY:  
 
Family History Problem Relation Age of Onset  Cancer Father  Diabetes Brother  Heart Disease Other  Hypertension Neg Hx  Stroke Neg Hx REVIEW OF SYSTEMS:  
 
Negative for fevers, chills, chest pain, shortness of breath, weight loss, recent illness General: Negative for fever and chills. No unexpected change in weight. Denies fatigue. No change in appetite. Skin: Negative for rash or itching. HEENT: Negative for congestion, sore throat, neck pain and neck stiffness. No change in vision or hearing. Hasn't noted any enlarged lymph nodes in the neck. Cardiovascular:  Negative for chest pain and palpitations. Has not noted pedal edema. Respiratory: Negative for cough, colds, sinus, hemoptysis, shortness of breath and wheezing. Gastrointestinal: Negative for nausea and vomiting, rectal bleeding, coffee ground emesis, abdominal pain, diarrhea and constipation. Genitourinary: Negative for dysuria, frequency urgency, or burning on micturition. No flank pain, no foul smelling urine, no difficulty with initiating urination. Hematological: Negative for bleeding or easy bruising. Musculoskeletal: Negative for arthralgias, back pain or neck pain. Neurological: Negative for dizziness, seizures or syncopal episodes. Denies headaches. Endocrine: Denies excessive thirst.  No heat/cold intolerance. Psychiatric: Negative for depression or insomnia. PHYSICAL EXAMINATION:  
 
VITALS:  
Visit Vitals BP (!) 154/99 (BP 1 Location: Right arm, BP Patient Position: Sitting) Pulse 84 Temp 97.3 °F (36.3 °C) (Temporal) Resp 16 Ht 6' 2\" (1.88 m) Wt 230 lb (104.3 kg) SpO2 99% BMI 29.53 kg/m² Pain Assessment  11/16/2020 Location of Pain Foot Pain Location Comment - Location Modifiers Left Severity of Pain 8 Quality of Pain Throbbing Quality of Pain Comment swelling Duration of Pain Persistent Frequency of Pain Constant Date Pain First Started - Aggravating Factors Stairs;Standing;Walking;Bending Limiting Behavior -  
Relieving Factors Nothing Relieving Factors Comment - Result of Injury No  
Work-Related Injury - Type of Injury -  
 
  
GEN:  Well developed, well nourished 44 y.o. male in no acute distress. PSYCH: Alert an oriented to person, place and time.  Mood, memory, affect, behavior and judgment normal. Speech normal in context and clarity. HEENT: Normocephalic and atraumatic. Eyes: Conjunctivae and EOM are normal.Pupils are equal, round, and reactive to light. External ear normal appearance, external nose normal appearing. Mouth/Throat: Oropharynx is clear and moist, able to handle oral secretions w/out difficulty, airway patent. NECK: Supple. Normal ROM, No lymphadenopathy. Trachea is midline. No bruising, swelling or deformity RESP: Clear to auscultation bilaterally. No audible wheezing from mouth. No rales, rhonchi. Normal effort and breath sounds. No respiratory use of accessory muscles during breathing or distress CHEST/ABDOMEN: Observation reveals: No audible wheezing from mouth. No accessory use of chest muscles during breathing. Non tender abdomen CARDIO:  Normal rate, regular rhythm and normal heart sounds. No MGR. ABDOMEN: Non-tender, non-distended, normoactive bowel sounds in all four quadrants. There is no tenderness. There is no rebound and no guarding. BACK: No CVA or spinal tenderness BREAST:  Deferred PELVIC:    Deferred RECTAL:  Deferred :           Deferred EXTREMITIES: EXAMINATION OF:  ANKLE/FOOT LEFT 
  
Psychiatry: Alert, oriented x 3 (name,place,time of day); speech normal in context and clarity, memory intact grossly, no involuntary movements - tremors, no dementia Gait: slow Tenderness: moderate  LEFT great toe Cutaneous: There are some swelling, redness to the dorsal part of the LEFT great toe, there is a wound, to the plantar lateral portion of the distal end of thisLEFT great toe, where he had this IP joint disarticulation amputation surgery many years ago Joint Motion: Diminished range of motion, great toe MTP Joint / Tendon Stability: No Ankle or Subtalar instability or joint laxity. No peroneal sublux ability or dislocation Alignment: neutral Hindfoot Neuro Motor/Sensory: NL/diminished incision monofilament testing, plantar forefoot Vascular: NL foot/ankle pulses, Lymphatics: No extremity lymphedema, No calf swelling, no tenderness to calf muscles. RADIOGRAPHS & DIAGNOSTIC STUDIES:  
 
MRI Results (most recent): 
Results from Orders Only encounter on 11/11/20 MRI FOOT LT WO CONT Narrative EXAM: MRI of the Left  foot INDICATION: Right toe wound TECHNIQUE: Multiplanar multisequence MR imaging of the left  foot IV Contrast: None COMPARISON: None FINDINGS:  
Osseous Structures: There is a plantar-based ulcer in the distal aspect of the 
residual right first toe. There is a osteomyelitis of the majority of the distal 
half of the proximal phalanx of the first toe. The distal phalanx previously had 
been resected. The remainder the osseous structures are unremarkable. No 
evidence of septic arthritis. Sesamoids are present and unremarkable. Ligaments: No gross ligamentous pathology appreciated. Tendons/Muscle: Likely denervation changes with atrophy of the foot consistent 
with diabetes. Soft Tissues/Other:  As mentioned previously, there is an ulcer in the plantar 
aspect of the first toe. No discrete fluid collection identified. Impression IMPRESSION:  
1. Osteomyelitis of the first proximal phalanx. LABS:  
 
 
 
No visits with results within 3 Day(s) from this visit. Latest known visit with results is:  
Hospital Outpatient Visit on 11/11/2020 Component Date Value Ref Range Status  C-Reactive protein 11/11/2020 6.3* 0 - 0.3 mg/dL Final  
 WBC 11/11/2020 6.6  4.6 - 13.2 K/uL Final  
 RBC 11/11/2020 4.66* 4.70 - 5.50 M/uL Final  
 HGB 11/11/2020 16.6* 13.0 - 16.0 g/dL Final  
 HCT 11/11/2020 46.2  36.0 - 48.0 % Final  
 MCV 11/11/2020 99.1* 74.0 - 97.0 FL Final  
 MCH 11/11/2020 35.6* 24.0 - 34.0 PG Final  
 MCHC 11/11/2020 35.9  31.0 - 37.0 g/dL Final  
 RDW 11/11/2020 12.5  11.6 - 14.5 % Final  
  PLATELET 66/19/3828 011  135 - 420 K/uL Final  
 MPV 11/11/2020 10.3  9.2 - 11.8 FL Final  
 NEUTROPHILS 11/11/2020 61  40 - 73 % Final  
 LYMPHOCYTES 11/11/2020 26  21 - 52 % Final  
 MONOCYTES 11/11/2020 12* 3 - 10 % Final  
 EOSINOPHILS 11/11/2020 1  0 - 5 % Final  
 BASOPHILS 11/11/2020 0  0 - 2 % Final  
 ABS. NEUTROPHILS 11/11/2020 4.1  1.8 - 8.0 K/UL Final  
 ABS. LYMPHOCYTES 11/11/2020 1.7  0.9 - 3.6 K/UL Final  
 ABS. MONOCYTES 11/11/2020 0.8  0.05 - 1.2 K/UL Final  
 ABS. EOSINOPHILS 11/11/2020 0.1  0.0 - 0.4 K/UL Final  
 ABS. BASOPHILS 11/11/2020 0.0  0.0 - 0.1 K/UL Final  
 DF 11/11/2020 AUTOMATED    Final  
 Sed rate, automated 11/11/2020 28* 0 - 20 mm/hr Final  
 Prothrombin time 11/11/2020 13.4  11.5 - 15.2 sec Final  
 INR 11/11/2020 1.0  0.8 - 1.2   Final  
 Comment:            INR Therapeutic Ranges (on stable oral anticoagulant): INDICATION                INR 
DVT/PE/Atrial Fib          2.0-3.0 MI/Mechanical Heart Valve  2.5-3.5 ASSESSMENT:  
 
Encounter Diagnoses ICD-10-CM ICD-9-CM 1. Other chronic [facility]omyel[facility]  M86.672 730.17 2. Pre-operative examination  Z01.818 V72.84 PLAN:  
 
Again, the alternatives, risks, and complications, as well as expected outcome were discussed. The patient understands and agrees to proceed with the above listed surgery pending completion of pre-operative labs and diagnostic studies. Patient will be admitted for observation following surgery. Infections disease will be consulted for medical management. 
  
PLAN: 
 
Follow-up and Dispositions · Return in about 1 week (around 11/23/2020) for post surgical evaluation. Citlalli Thurston MPA, PA-C 
11/16/2020 
1:27 PM 
 
Tammy Carlson MD 
11/17/2020 
7:09 PM

## 2020-11-16 NOTE — PATIENT INSTRUCTIONS
Dr. Dima Amanda Pre-operative Instructions: 
 
 
Patient: Sylvia Matt :  1970 I understand I am to stop taking oral birth control pills, hormonal replacement therapy and all Aspirin, Aspirin containing medications, Non-Steroidal Anti-Inflammatory medications (such as Advil, Aleve, Motrin, Ibuprofen) and or Blood thinner medication such as Coumadin, Plavix, Heparin or others 5 days prior to surgery. I understand I am to STOP taking these Medications 5 days prior to surgery: 
I am to get instructions from my prescribing physician. 1. __As listed above_______________________ 2. _____________________________________ 3. _____________________________________ 4. _____________________________________ I understand that if I am taking daily medications for high blood pressure, I can take them the morning of surgery with a small sip of water. I will consult my prescribing physician or call DAVINA with specific questions. I also understand that: ? I am to report important observations or changes that may occur prior to surgery. If I have any changes in my physical condition, such as a rash, a fever, sore throat, abscess, ulcers, nausea, vomiting, or diarrhea. I am to call the office and I am to consult my primary care physician to assess and treat the problem. ? I am not to eat or drink anything after midnight the night before my surgery. ? I am not to drink alcoholic beverages 24 hours prior to surgery. ? I am not to do any illegal drugs prior to surgery. ? I am not to smoke at least 24 hours prior to surgery. ? I am able and will shower or bathe before surgery. I will use the Hibiclens solution on my surgical site only. The hibiclens directions are one packet a day starting two days before surgery. ? I will remove any nail polish, make-up or jewelry prior to arriving for my surgery. ?  If I wear glasses, contact lenses or dentures they must be removed prior to going to the operating room. ? All body piercing and artifical eye-lashes must be removed prior to surgery ? I will not wear any aerosol sprays, perfumes or skin creams. ? I am to make arrangements for a family member or friend to accompany me to surgery and take me home after my surgery as I will not be allowed to leave the hospital alone. A cab or bus will not be acceptable. Please make arrange for someone to stay with you for 24 hours after surgery. ? Patient has expressed understanding of the diagnosis, treatment and planned surgery Post operative medications will be e-scribed to your pharmacy on record if possible Acute Pain After Surgery: Care Instructions Your Care Instructions It's common to have some pain after surgery. Pain doesn't mean that something is wrong or that the surgery didn't go well. But when the pain is severe, it's important to work with your doctor to manage it. It's also important to be aware of a few facts about pain and pain medicine. · You are the only person who knows what your pain feels like. So be sure to tell your doctor when you are in pain or when the pain changes. Then he or she will know how to adjust your medicines. · Pain is often easier to control right after it starts. So it may be better to take regular doses of pain medicine and not wait until the pain gets bad. · Medicine can help control pain. But this doesn't mean you'll have no pain. Medicine works to keep the pain at a level you can live with. With time, you will feel better. Follow-up care is a key part of your treatment and safety. Be sure to make and go to all appointments, and call your doctor if you are having problems. It's also a good idea to know your test results and keep a list of the medicines you take. How can you care for yourself at home? · Be safe with medicines. Read and follow all instructions on the label. ¨ If the doctor gave you a prescription medicine for pain, take it as prescribed. ¨ If you are not taking a prescription pain medicine, ask your doctor if you can take an over-the-counter medicine. · If you take an over-the-counter pain medicine, such as acetaminophen (Tylenol), ibuprofen (Advil, Motrin), or naproxen (Aleve), read and follow all instructions on the label. · Do not take two or more pain medicines at the same time unless the doctor told you to. · Do not drink alcohol while you are taking pain medicines. · Try to walk each day if your doctor recommends it. Start by walking a little more than you did the day before. Bit by bit, increase the amount you walk. Walking increases blood flow. It also helps prevent pneumonia and constipation. · To prevent constipation from opioid pain medicines: ¨ Talk to your doctor about a laxative. ¨ Include fruits, vegetables, beans, and whole grains in your diet each day. These foods are high in fiber. ¨ Drink plenty of fluids, enough so that your urine is light yellow or clear like water. Drink water, fruit juice, or other drinks that do not contain caffeine or alcohol. If you have kidney, heart, or liver disease and have to limit fluids, talk with your doctor before you increase the amount of fluids you drink. ¨ Take a fiber supplement, such as Citrucel or Metamucil, every day if needed. Read and follow all instructions on the label. If you take pain medicine for more than a few days, talk to your doctor before you take fiber. When should you call for help? Call your doctor now or seek immediate medical care if: 
 · Your pain gets worse. · Your pain is not controlled by medicine. Watch closely for changes in your health, and be sure to contact your doctor if you have any problems.

## 2020-11-17 ENCOUNTER — ANESTHESIA (OUTPATIENT)
Dept: SURGERY | Age: 50
DRG: 504 | End: 2020-11-17
Payer: COMMERCIAL

## 2020-11-17 ENCOUNTER — HOSPITAL ENCOUNTER (INPATIENT)
Age: 50
LOS: 2 days | Discharge: HOME HEALTH | DRG: 504 | End: 2020-11-20
Attending: ORTHOPAEDIC SURGERY | Admitting: ORTHOPAEDIC SURGERY
Payer: COMMERCIAL

## 2020-11-17 DIAGNOSIS — M86.672 OTHER CHRONIC OSTEOMYELITIS OF LEFT FOOT (HCC): Primary | ICD-10-CM

## 2020-11-17 LAB
COVID-19 RAPID TEST, COVR: NOT DETECTED
SOURCE, COVRS: NORMAL
SPECIMEN TYPE, XMCV1T: NORMAL

## 2020-11-17 PROCEDURE — 77030013179 HC SHOE PSTOP OPN DJOR -A: Performed by: ORTHOPAEDIC SURGERY

## 2020-11-17 PROCEDURE — 87205 SMEAR GRAM STAIN: CPT

## 2020-11-17 PROCEDURE — 28820 AMPUTATION OF TOE: CPT | Performed by: ORTHOPAEDIC SURGERY

## 2020-11-17 PROCEDURE — 74011250636 HC RX REV CODE- 250/636: Performed by: NURSE ANESTHETIST, CERTIFIED REGISTERED

## 2020-11-17 PROCEDURE — 2709999900 HC NON-CHARGEABLE SUPPLY: Performed by: ORTHOPAEDIC SURGERY

## 2020-11-17 PROCEDURE — 0Y6Q0Z1 DETACHMENT AT LEFT 1ST TOE, HIGH, OPEN APPROACH: ICD-10-PCS | Performed by: ORTHOPAEDIC SURGERY

## 2020-11-17 PROCEDURE — 74011250636 HC RX REV CODE- 250/636: Performed by: PHYSICIAN ASSISTANT

## 2020-11-17 PROCEDURE — 76010000149 HC OR TIME 1 TO 1.5 HR: Performed by: ORTHOPAEDIC SURGERY

## 2020-11-17 PROCEDURE — 01480 ANES OPEN PX LOWER L/A/F NOS: CPT | Performed by: ANESTHESIOLOGY

## 2020-11-17 PROCEDURE — 76210000006 HC OR PH I REC 0.5 TO 1 HR: Performed by: ORTHOPAEDIC SURGERY

## 2020-11-17 PROCEDURE — 0QBR0ZZ EXCISION OF LEFT TOE PHALANX, OPEN APPROACH: ICD-10-PCS | Performed by: ORTHOPAEDIC SURGERY

## 2020-11-17 PROCEDURE — 01480 ANES OPEN PX LOWER L/A/F NOS: CPT | Performed by: NURSE ANESTHETIST, CERTIFIED REGISTERED

## 2020-11-17 PROCEDURE — 77030002933 HC SUT MCRYL J&J -A: Performed by: ORTHOPAEDIC SURGERY

## 2020-11-17 PROCEDURE — 74011000250 HC RX REV CODE- 250: Performed by: ORTHOPAEDIC SURGERY

## 2020-11-17 PROCEDURE — 87075 CULTR BACTERIA EXCEPT BLOOD: CPT

## 2020-11-17 PROCEDURE — 74011000250 HC RX REV CODE- 250: Performed by: NURSE ANESTHETIST, CERTIFIED REGISTERED

## 2020-11-17 PROCEDURE — 99140 ANES COMP EMERGENCY COND: CPT | Performed by: ANESTHESIOLOGY

## 2020-11-17 PROCEDURE — 87186 SC STD MICRODIL/AGAR DIL: CPT

## 2020-11-17 PROCEDURE — 74011250637 HC RX REV CODE- 250/637: Performed by: NURSE ANESTHETIST, CERTIFIED REGISTERED

## 2020-11-17 PROCEDURE — 77030013481 HC CUF TRNQT ZIMM -A: Performed by: ORTHOPAEDIC SURGERY

## 2020-11-17 PROCEDURE — 99140 ANES COMP EMERGENCY COND: CPT | Performed by: NURSE ANESTHETIST, CERTIFIED REGISTERED

## 2020-11-17 PROCEDURE — 87635 SARS-COV-2 COVID-19 AMP PRB: CPT

## 2020-11-17 PROCEDURE — 77030012422 HC DRN WND COVD -A: Performed by: ORTHOPAEDIC SURGERY

## 2020-11-17 PROCEDURE — 76060000033 HC ANESTHESIA 1 TO 1.5 HR: Performed by: ORTHOPAEDIC SURGERY

## 2020-11-17 PROCEDURE — 74011000272 HC RX REV CODE- 272: Performed by: ORTHOPAEDIC SURGERY

## 2020-11-17 PROCEDURE — 77030002916 HC SUT ETHLN J&J -A: Performed by: ORTHOPAEDIC SURGERY

## 2020-11-17 PROCEDURE — 87077 CULTURE AEROBIC IDENTIFY: CPT

## 2020-11-17 RX ORDER — FAMOTIDINE 20 MG/1
20 TABLET, FILM COATED ORAL ONCE
Status: COMPLETED | OUTPATIENT
Start: 2020-11-17 | End: 2020-11-17

## 2020-11-17 RX ORDER — SODIUM CHLORIDE, SODIUM LACTATE, POTASSIUM CHLORIDE, CALCIUM CHLORIDE 600; 310; 30; 20 MG/100ML; MG/100ML; MG/100ML; MG/100ML
50 INJECTION, SOLUTION INTRAVENOUS CONTINUOUS
Status: DISCONTINUED | OUTPATIENT
Start: 2020-11-17 | End: 2020-11-18 | Stop reason: HOSPADM

## 2020-11-17 RX ORDER — DEXAMETHASONE SODIUM PHOSPHATE 4 MG/ML
INJECTION, SOLUTION INTRA-ARTICULAR; INTRALESIONAL; INTRAMUSCULAR; INTRAVENOUS; SOFT TISSUE AS NEEDED
Status: DISCONTINUED | OUTPATIENT
Start: 2020-11-17 | End: 2020-11-18 | Stop reason: HOSPADM

## 2020-11-17 RX ORDER — CEFAZOLIN SODIUM 2 G/50ML
2 SOLUTION INTRAVENOUS ONCE
Status: COMPLETED | OUTPATIENT
Start: 2020-11-17 | End: 2020-11-17

## 2020-11-17 RX ORDER — NAPROXEN SODIUM 220 MG
220 TABLET ORAL AS NEEDED
COMMUNITY
End: 2020-11-20

## 2020-11-17 RX ORDER — PROPOFOL 10 MG/ML
INJECTION, EMULSION INTRAVENOUS AS NEEDED
Status: DISCONTINUED | OUTPATIENT
Start: 2020-11-17 | End: 2020-11-18 | Stop reason: HOSPADM

## 2020-11-17 RX ORDER — SODIUM CHLORIDE, SODIUM LACTATE, POTASSIUM CHLORIDE, CALCIUM CHLORIDE 600; 310; 30; 20 MG/100ML; MG/100ML; MG/100ML; MG/100ML
100 INJECTION, SOLUTION INTRAVENOUS CONTINUOUS
Status: DISCONTINUED | OUTPATIENT
Start: 2020-11-17 | End: 2020-11-18 | Stop reason: HOSPADM

## 2020-11-17 RX ORDER — MIDAZOLAM HYDROCHLORIDE 1 MG/ML
INJECTION, SOLUTION INTRAMUSCULAR; INTRAVENOUS AS NEEDED
Status: DISCONTINUED | OUTPATIENT
Start: 2020-11-17 | End: 2020-11-18 | Stop reason: HOSPADM

## 2020-11-17 RX ORDER — SODIUM CHLORIDE 0.9 % (FLUSH) 0.9 %
5-40 SYRINGE (ML) INJECTION EVERY 8 HOURS
Status: DISCONTINUED | OUTPATIENT
Start: 2020-11-17 | End: 2020-11-18 | Stop reason: HOSPADM

## 2020-11-17 RX ORDER — HYDROCODONE BITARTRATE AND ACETAMINOPHEN 7.5; 325 MG/1; MG/1
2 TABLET ORAL
Qty: 28 TAB | Refills: 0 | Status: SHIPPED | OUTPATIENT
Start: 2020-11-17 | End: 2020-11-30 | Stop reason: SDUPTHER

## 2020-11-17 RX ORDER — FENTANYL CITRATE 50 UG/ML
INJECTION, SOLUTION INTRAMUSCULAR; INTRAVENOUS AS NEEDED
Status: DISCONTINUED | OUTPATIENT
Start: 2020-11-17 | End: 2020-11-18 | Stop reason: HOSPADM

## 2020-11-17 RX ORDER — SODIUM CHLORIDE 0.9 % (FLUSH) 0.9 %
5-40 SYRINGE (ML) INJECTION AS NEEDED
Status: DISCONTINUED | OUTPATIENT
Start: 2020-11-17 | End: 2020-11-18 | Stop reason: HOSPADM

## 2020-11-17 RX ORDER — SODIUM CHLORIDE, SODIUM LACTATE, POTASSIUM CHLORIDE, CALCIUM CHLORIDE 600; 310; 30; 20 MG/100ML; MG/100ML; MG/100ML; MG/100ML
INJECTION, SOLUTION INTRAVENOUS
Status: DISCONTINUED | OUTPATIENT
Start: 2020-11-17 | End: 2020-11-18 | Stop reason: HOSPADM

## 2020-11-17 RX ORDER — LIDOCAINE HYDROCHLORIDE 20 MG/ML
INJECTION, SOLUTION EPIDURAL; INFILTRATION; INTRACAUDAL; PERINEURAL AS NEEDED
Status: DISCONTINUED | OUTPATIENT
Start: 2020-11-17 | End: 2020-11-18 | Stop reason: HOSPADM

## 2020-11-17 RX ADMIN — MIDAZOLAM HYDROCHLORIDE 2 MG: 2 INJECTION, SOLUTION INTRAMUSCULAR; INTRAVENOUS at 23:12

## 2020-11-17 RX ADMIN — PROPOFOL 200 MG: 10 INJECTION, EMULSION INTRAVENOUS at 23:21

## 2020-11-17 RX ADMIN — SODIUM CHLORIDE, SODIUM LACTATE, POTASSIUM CHLORIDE, AND CALCIUM CHLORIDE: 600; 310; 30; 20 INJECTION, SOLUTION INTRAVENOUS at 23:12

## 2020-11-17 RX ADMIN — FAMOTIDINE 20 MG: 20 TABLET, FILM COATED ORAL at 15:46

## 2020-11-17 RX ADMIN — SODIUM CHLORIDE, SODIUM LACTATE, POTASSIUM CHLORIDE, AND CALCIUM CHLORIDE 50 ML/HR: 600; 310; 30; 20 INJECTION, SOLUTION INTRAVENOUS at 15:46

## 2020-11-17 RX ADMIN — FENTANYL CITRATE 100 MCG: 50 INJECTION, SOLUTION INTRAMUSCULAR; INTRAVENOUS at 23:12

## 2020-11-17 RX ADMIN — DEXAMETHASONE SODIUM PHOSPHATE 8 MG: 4 INJECTION, SOLUTION INTRAMUSCULAR; INTRAVENOUS at 23:31

## 2020-11-17 RX ADMIN — LIDOCAINE HYDROCHLORIDE 100 MG: 20 INJECTION, SOLUTION EPIDURAL; INFILTRATION; INTRACAUDAL; PERINEURAL at 23:21

## 2020-11-17 RX ADMIN — CEFAZOLIN SODIUM 2 G: 2 SOLUTION INTRAVENOUS at 23:25

## 2020-11-17 NOTE — OP NOTES
Patient: Theresa Jones                MRN:@           SSN: [id number]   YOB: 1970         AGE: 44 y.o. SEX: male       OPERATIVE REPORT    Patient: Theresa Jones  YOB: 1970  MRN: [id number]    Date of Procedure: 11/17/2020     Pre-Op Diagnosis:  Osteomyelitis of the left great toe distal half of the proximal phalanx, status post remote left great toe distal phalanx IP joint disarticulation amputation surgery many years ago. Septic OA MTP joint    Post-Op Diagnosis: Same as preoperative diagnosis. Procedure(s):    AMPUTATION LEFT GREAT TOE/ PROXIMAL PHALANX  MTP joint disarticulation amputation surgery left great toe, GRAM STAIN// CULTURES/// BONE SPECIMEN   Sharp Excisional Debridement of Skin, Subcutaneous fat and Bone from distal end of proximal phalanx. Surgeon(s):  Jacki Vallejo MD    Surgical Assistant: Surg Asst-1:  Ирина Conway SA    Anesthesia: General //local anesthetic half percent Marcaine plain 10 ml      Estimated Blood Loss (mL): 25 ml EBL      IV FLUIDS:  700 ml IVF  Tourniquet Time:  20 minutes at  300  Mm HG      Complications: None    Specimens: Proximal phalanx, specimen: Specimen the proximal phalanx    Implants: * No implants in log *    Drains: 1/4 inch Penrose drain yes    Findings:    Septic OA 1ST MTP/// OSTEOMYELITIS LEFT GREAT TOE PROXIMAL PHALANX    Electronically Signed by Mika Poole MD on 11/17/2020 at 5:26 PM        OPERATIVE REPORT        Theresa Jones was taken to the operating room on this date of 11/18/2020 . General anesthesia was attained. A regional block was not performed prior to taking the patient to the operating room.      A formal verbal  time out was conducted: identifying the patient, identifying the surgical location, reading the informed written signed consent for procedure, confirmation that  the patient did receive preoperative antibiotics and that my signature on the patient was visible at the surgical field. All members of the surgical team agreed to the consent process. The left lower extremity was cleaned, sterile with A /BETADINE STERILE PREP. I did use a esmarch and did use a tourniquet. I made fishmouth type skin demarcation to the LEFT great toe foot. I made a dorsal incision as its was made through skin, subcutaneous tissue, down to the extensor tendons, and to the left great toe proximal phalanx. I made sure that a longer plantar flap is planned. I identified the terminal branches of the medial plantar nerves so that each structure could be identified, ligated, and divided. I identified the terminal branches of the digital arteries so that each structure could be identified, ligated, and divided. Intraoperative findings, revealed a very soft bone of the proximal phalanx, osteomyelitic bone, as well as septic arthritis of the great toe first MTP. Sharp Excisional Debridement ok Skin, Subcutaneous fat and Bone from proximal phalanx.//  MTP disarticulation performed. Septic MTP joint and necrotic bONE OF proximal Phalanx is present. Thorough lavage of the surgical field was conducted, with antibiotic containing irrigation, and nonantibiotic containing irrigation. I also irrigated this and soaked this wound and surgical field with Betadine. The tourniquet had been deflated after 20 minutes of insufflation. After additional thorough irrigation, I tied off the 2 digital blood vessels, with Monocryl suture. I used selective electro cauterization for hemostasis. After thorough lavage, the incision was then re approximated, the wound was re approximated: Using Monocryl 3-0 suture, followed by 3-0 nylon. A Penrose drain, 1/4 inch Penrose drain, was left in place, on the medial aspect of this first MTP site. It was not sewn in. Sterile dressings were placed consisting of Xeroform 4 x 4's 4 inch Kerlix 3 inch Ace wrap, 4 inch Ace wrap, 1 single ABD.      I then fashioned the plantar flap after removing the distal specimen. The tourniquet was deflated and meticulous hemostasis is obtained. After appropriate irrigation, the plantar flap is brought up and then the incision was closed with 3/0 Monocryl and then 3/0 Nylon. Sterile dressings were applied: Xeroform, 4 X 4 gauze,  ABDs and 4 inch ACE wraps. Prior and after to closure : correct needle counts, tape counts and instruments were noted and documented in the chart. Thompson Stinson was extubated and sent to recovery room. I left a message with his wife, [phone] informing her that surgery had gone well.       Ashly Bennett MD  11/18/2020  5:29 PM

## 2020-11-17 NOTE — BRIEF OP NOTE
Brief Postoperative Note    Patient: Richard Tao  YOB: 1970  MRN: [id number]    Date of Procedure: 11/17/2020     Pre-Op Diagnosis: Osteomyelitis of the left great toe distal half of the proximal phalanx, status post remote left great toe distal phalanx IP joint disarticulation amputation surgery many years ago. Post-Op Diagnosis: Same as preoperative diagnosis. Septic OA MTP    Procedure(s):  AMPUTATION LEFT GREAT TOE/ PROXIMAL PHALANX  MTP joint disarticulation amputation surgery left great toe, GRAM STAIN// CULTURES/// BONE SPECIMEN   Sharp Excisional Debridement ok Skin, Subcutaneous fat and Bone.//  MTP disarticulation performed.     Surgeon(s):  Liliya Rodriguez MD    Surgical Assistant: Surg Asst-1: debra whitten    Anesthesia: General //local anesthetic half percent Marcaine plain 10 ml      Estimated Blood Loss (mL): 25 ml EBL      IV FLUIDS:  700 ml IVF  Tourniquet Time:  20 minutes at  300  Mm HG      Complications: None    Specimens: * No specimens in log *     Implants: * No implants in log *    Drains: * No LDAs found *    Findings:    Septic OA 1ST MTP/// OSTEOMYELITIS LEFT GREAT TOE PROXIMAL PHALANX    Electronically Signed by Aiden Mustafa MD on 11/17/2020 at 5:26 PM

## 2020-11-17 NOTE — PROGRESS NOTES
PROGRESS NOTE      Patient: Patsy Burch  MRN: [id number]  SSN: [id number]   YOB: 1970  Age: 44 y.o. Sex: male     Admit Date: 11/17/2020 LOS:  LOS: 0 days      POD # 1 S/P Procedure(s):  AMPUTATION LEFT GREAT TOE/ PROXIMAL PHALANX  SEPTIC LEFT GREAT TOE MTP      ASSESSMENT/PLAN     Patsy Burch is  Resting at this time. Pain controlled. Dressings are in place//    1. Orthopaedic:  A. Pain Meds : Norco Rx written  B.  DVT Prophylaxis: SCD's,  mg one po each day// start  11/18/2020. C.  IV AB: Antibiotics:  Zosyn // Rocephin IV AB yes for great toe infection (LEFT)  D. Weight Bear: PWB left heel  E.  Physican's following patient: Consultants following patients: Infectious Disease    Yes consulted and following patient  F.  PT/OT/ Intervention/ Consults:    PT/OT : [x]PT / [] OT ordered // [facility] disposition:  ,      [x]  []  [] [address]       Visit Vitals  BP (!) (P) 151/86 (BP 1 Location: Left arm, BP Patient Position: At rest)   Pulse 86   Temp (P) 97.8 °F (36.6 °C)   Resp 17   Ht 6' 2\" (1.88 m)   Wt 230 lb (104.3 kg)   SpO2 98%   BMI 29.53 kg/m²       Appearance: Alert, well appearing and pleasant patient who is in no distress, oriented to person, place/time, and who follows commands. This patient is accompanied in the examination room by his  self. no dementia  Psychiatric: Affect and mood are appropriate. Respiratory: Breathing is unlabored without accessory chest muscle use  Peripheral Vascular: Normal Pulses to each hand and foot    EXTREMITY LEFT FOOT    DRESSINGS: Clean,dry , intact    INCISIONS:  dressings covering the incision  not assessed)     Extremities:        No embolic phenomena to the toes               No significant edema to the foot and or toes.                 Pulses in tact to the left and right foot             Lower extremities are warm and appear well perfused DVT: No evidence of DVT seen on examination at this time            Moves lower extremities well (ankle DF/PF bilateral)         Moves Upper extremities well      LABS AND MEDICATION REVIEW      CMP: No results found for: NA, K, CL, CO2, AGAP, GLU, BUN, CREA, GFRAA, GFRNA, CA, MG, PHOS, ALB, TBIL, TP, ALB, GLOB, AGRAT, ALT  CBC: No results found for: WBC, HGB, HGBEXT, HCT, HCTEXT, PLT, PLTEXT, HGBEXT, HCTEXT, PLTEXT  COAGS: No results found for: APTT, PTP, INR, INREXT, INREXT     Results     Procedure Component Value Units Date/Time    CULTURE, ANAEROBIC [[id number]]     Order Status:  Sent Specimen:   Toe     CULTURE, TISSUE Cornell Orris STAIN [[id number]]     Order Status:  Sent Specimen:  Great Toe            Current Facility-Administered Medications   Medication Dose Route Frequency    lactated Ringers infusion  50 mL/hr IntraVENous CONTINUOUS    sodium chloride (NS) flush 5-40 mL  5-40 mL IntraVENous Q8H    sodium chloride (NS) flush 5-40 mL  5-40 mL IntraVENous PRN    fentaNYL citrate (PF) injection 50 mcg  50 mcg IntraVENous PRN    HYDROmorphone (DILAUDID) injection 0.5 mg  0.5 mg IntraVENous Multiple    naloxone (NARCAN) injection 0.1 mg  0.1 mg IntraVENous PRN    ondansetron (ZOFRAN) injection 4 mg  4 mg IntraVENous ONCE    diphenhydrAMINE (BENADRYL) injection 12.5 mg  12.5 mg IntraVENous Multiple    sodium chloride (NS) flush 5-40 mL  5-40 mL IntraVENous Q8H    sodium chloride (NS) flush 5-40 mL  5-40 mL IntraVENous PRN    naloxone (NARCAN) injection 0.4 mg  0.4 mg IntraVENous PRN    HYDROcodone-acetaminophen (NORCO) 7.5-325 mg per tablet 1 Tab  1 Tab Oral Q4H PRN    HYDROcodone-acetaminophen (NORCO)  mg tablet 2 Tab  2 Tab Oral Q4H PRN    ondansetron (ZOFRAN) injection 4 mg  4 mg IntraVENous Q4H PRN    cefTRIAXone (ROCEPHIN) 1 g in sterile water (preservative free) 10 mL IV syringe  1 g IntraVENous Q12H    piperacillin-tazobactam (ZOSYN) 3.375 g in 0.9% sodium chloride (MBP/ADV) 100 mL MBP 3.375 g IntraVENous Q6H          REVIEW OF SYSTEMS : 11/18/2020  ALL BELOW ARE Negative except : SEE HPI        Past Medical History:   Diagnosis Date    Left toe amputee [facility])       Past Surgical History:   Procedure Laterality Date    FOOT/TOES SURGERY PROC UNLISTED      HX HEENT  02/2020    Lip excision (lower)      Social History     Socioeconomic History    Marital status:      Spouse name: Not on file    Number of children: Not on file    Years of education: Not on file    Highest education level: Not on file   Occupational History    Not on file   Social Needs    Financial resource strain: Not on file    Food insecurity     Worry: Not on file     Inability: Not on file    Transportation needs     Medical: Not on file     Non-medical: Not on file   Tobacco Use    Smoking status: Former Smoker    Smokeless tobacco: Never Used   Substance and Sexual Activity    Alcohol use: Yes     Comment: social    Drug use: Not Currently     Types: Marijuana     Comment: last 2005    Sexual activity: Not on file   Lifestyle    Physical activity     Days per week: Not on file     Minutes per session: Not on file    Stress: Not on file   Relationships    Social connections     Talks on phone: Not on file     Gets together: Not on file     Attends Scientologist service: Not on file     Active member of club or organization: Not on file     Attends meetings of clubs or organizations: Not on file     Relationship status: Not on file    Intimate partner violence     Fear of current or ex partner: Not on file     Emotionally abused: Not on file     Physically abused: Not on file     Forced sexual activity: Not on file   Other Topics Concern    Not on file   Social History Narrative    Not on file      Family History   Problem Relation Age of Onset    Cancer Father     Diabetes Brother     Heart Disease Other     Hypertension Neg Hx     Stroke Neg Hx      Prior to Admission medications    Medication Sig Start Date End Date Taking? Authorizing Provider   naproxen sodium (Aleve) 220 mg tablet Take 220 mg by mouth as needed. Yes Provider, Historical   HYDROcodone-acetaminophen (NORCO) 7.5-325 mg per tablet Take 2 Tabs by mouth every six (6) hours as needed for Pain for up to 14 days. Max Daily Amount: 8 Tabs. One to two po every 4 to 6 hrs prn pain 11/17/20 12/1/20 Yes Lidia Devries MD   ciprofloxacin HCl (CIPRO) 750 mg tablet Take 1 Tab by mouth two (2) times a day. 11/11/20  Yes Regino Wallis MD   silver-foam bandage (AQUACEL AG FOAM) 1.2 %- 3.2\" X 3.2\" bndg 1 Each by Apply Externally route daily. 9/6/16  Yes Nakul Franco PA-C   HYDROcodone-acetaminophen (NORCO) 7.5-325 mg per tablet Take 1-2 Tabs by mouth every eight (8) hours as needed for Pain (AFTER SURGERY, NOT BEFORE) for up to 7 days. Max Daily Amount: 6 Tabs. 11/16/20 11/23/20  Nakul Franco PA-C   ondansetron (ZOFRAN ODT) 4 mg disintegrating tablet Take 1 Tab by mouth every eight (8) hours as needed for Nausea or Vomiting. Indications: prevent nausea and vomiting after surgery 11/16/20   Nakul Franco PA-C   aspirin (ASPIRIN) 325 mg tablet Take 1 Tab by mouth two (2) times daily (after meals). 11/16/20   Nakul Franco PA-C   polyethylene glycol (Miralax) 17 gram packet Take 1 Packet by mouth daily.  11/16/20   Nakul Franco PA-C     Current Facility-Administered Medications   Medication Dose Route Frequency    lactated Ringers infusion  50 mL/hr IntraVENous CONTINUOUS    sodium chloride (NS) flush 5-40 mL  5-40 mL IntraVENous Q8H    sodium chloride (NS) flush 5-40 mL  5-40 mL IntraVENous PRN    fentaNYL citrate (PF) injection 50 mcg  50 mcg IntraVENous PRN    HYDROmorphone (DILAUDID) injection 0.5 mg  0.5 mg IntraVENous Multiple    naloxone (NARCAN) injection 0.1 mg  0.1 mg IntraVENous PRN    ondansetron (ZOFRAN) injection 4 mg  4 mg IntraVENous ONCE    diphenhydrAMINE (BENADRYL) injection 12.5 mg  12.5 mg IntraVENous Multiple    sodium chloride (NS) flush 5-40 mL  5-40 mL IntraVENous Q8H    sodium chloride (NS) flush 5-40 mL  5-40 mL IntraVENous PRN    naloxone (NARCAN) injection 0.4 mg  0.4 mg IntraVENous PRN    HYDROcodone-acetaminophen (NORCO) 7.5-325 mg per tablet 1 Tab  1 Tab Oral Q4H PRN    HYDROcodone-acetaminophen (NORCO)  mg tablet 2 Tab  2 Tab Oral Q4H PRN    ondansetron (ZOFRAN) injection 4 mg  4 mg IntraVENous Q4H PRN    cefTRIAXone (ROCEPHIN) 1 g in sterile water (preservative free) 10 mL IV syringe  1 g IntraVENous Q12H    piperacillin-tazobactam (ZOSYN) 3.375 g in 0.9% sodium chloride (MBP/ADV) 100 mL MBP  3.375 g IntraVENous Q6H     Allergies   Allergen Reactions    Bactrim [Sulfamethoprim Ds] Rash           Tanvi Lucas MD  11/18/2020  12:47 AM

## 2020-11-18 PROBLEM — M19.079 OA (OSTEOARTHRITIS) OF FOOT: Status: ACTIVE | Noted: 2020-11-18

## 2020-11-18 PROCEDURE — 77030027138 HC INCENT SPIROMETER -A

## 2020-11-18 PROCEDURE — 65270000029 HC RM PRIVATE

## 2020-11-18 PROCEDURE — 2709999900 HC NON-CHARGEABLE SUPPLY

## 2020-11-18 PROCEDURE — 74011250636 HC RX REV CODE- 250/636: Performed by: NURSE ANESTHETIST, CERTIFIED REGISTERED

## 2020-11-18 PROCEDURE — 74011250637 HC RX REV CODE- 250/637: Performed by: ORTHOPAEDIC SURGERY

## 2020-11-18 PROCEDURE — 97161 PT EVAL LOW COMPLEX 20 MIN: CPT

## 2020-11-18 PROCEDURE — 88305 TISSUE EXAM BY PATHOLOGIST: CPT

## 2020-11-18 PROCEDURE — 74011250636 HC RX REV CODE- 250/636: Performed by: ORTHOPAEDIC SURGERY

## 2020-11-18 PROCEDURE — 88311 DECALCIFY TISSUE: CPT

## 2020-11-18 PROCEDURE — 74011250637 HC RX REV CODE- 250/637: Performed by: INTERNAL MEDICINE

## 2020-11-18 PROCEDURE — 74011000250 HC RX REV CODE- 250: Performed by: ORTHOPAEDIC SURGERY

## 2020-11-18 PROCEDURE — 74011000258 HC RX REV CODE- 258: Performed by: ORTHOPAEDIC SURGERY

## 2020-11-18 RX ORDER — SODIUM CHLORIDE 0.9 % (FLUSH) 0.9 %
5-40 SYRINGE (ML) INJECTION AS NEEDED
Status: DISCONTINUED | OUTPATIENT
Start: 2020-11-18 | End: 2020-11-18 | Stop reason: HOSPADM

## 2020-11-18 RX ORDER — SODIUM CHLORIDE 0.9 % (FLUSH) 0.9 %
5-40 SYRINGE (ML) INJECTION EVERY 8 HOURS
Status: DISCONTINUED | OUTPATIENT
Start: 2020-11-18 | End: 2020-11-20 | Stop reason: HOSPADM

## 2020-11-18 RX ORDER — ONDANSETRON 2 MG/ML
INJECTION INTRAMUSCULAR; INTRAVENOUS AS NEEDED
Status: DISCONTINUED | OUTPATIENT
Start: 2020-11-18 | End: 2020-11-18 | Stop reason: HOSPADM

## 2020-11-18 RX ORDER — SODIUM CHLORIDE 0.9 % (FLUSH) 0.9 %
5-40 SYRINGE (ML) INJECTION AS NEEDED
Status: DISCONTINUED | OUTPATIENT
Start: 2020-11-18 | End: 2020-11-20 | Stop reason: HOSPADM

## 2020-11-18 RX ORDER — FENTANYL CITRATE 50 UG/ML
50 INJECTION, SOLUTION INTRAMUSCULAR; INTRAVENOUS AS NEEDED
Status: DISCONTINUED | OUTPATIENT
Start: 2020-11-18 | End: 2020-11-18 | Stop reason: HOSPADM

## 2020-11-18 RX ORDER — ASPIRIN 325 MG
325 TABLET ORAL
Status: DISCONTINUED | OUTPATIENT
Start: 2020-11-18 | End: 2020-11-20 | Stop reason: HOSPADM

## 2020-11-18 RX ORDER — ONDANSETRON 2 MG/ML
4 INJECTION INTRAMUSCULAR; INTRAVENOUS ONCE
Status: DISCONTINUED | OUTPATIENT
Start: 2020-11-18 | End: 2020-11-18 | Stop reason: HOSPADM

## 2020-11-18 RX ORDER — ONDANSETRON 2 MG/ML
4 INJECTION INTRAMUSCULAR; INTRAVENOUS
Status: DISCONTINUED | OUTPATIENT
Start: 2020-11-18 | End: 2020-11-20 | Stop reason: HOSPADM

## 2020-11-18 RX ORDER — SODIUM CHLORIDE, SODIUM LACTATE, POTASSIUM CHLORIDE, CALCIUM CHLORIDE 600; 310; 30; 20 MG/100ML; MG/100ML; MG/100ML; MG/100ML
50 INJECTION, SOLUTION INTRAVENOUS CONTINUOUS
Status: DISCONTINUED | OUTPATIENT
Start: 2020-11-18 | End: 2020-11-18 | Stop reason: HOSPADM

## 2020-11-18 RX ORDER — NALOXONE HYDROCHLORIDE 0.4 MG/ML
0.1 INJECTION, SOLUTION INTRAMUSCULAR; INTRAVENOUS; SUBCUTANEOUS AS NEEDED
Status: DISCONTINUED | OUTPATIENT
Start: 2020-11-18 | End: 2020-11-18

## 2020-11-18 RX ORDER — SODIUM CHLORIDE 0.9 % (FLUSH) 0.9 %
5-40 SYRINGE (ML) INJECTION EVERY 8 HOURS
Status: DISCONTINUED | OUTPATIENT
Start: 2020-11-18 | End: 2020-11-18 | Stop reason: HOSPADM

## 2020-11-18 RX ORDER — BUPIVACAINE HYDROCHLORIDE 5 MG/ML
INJECTION, SOLUTION EPIDURAL; INTRACAUDAL AS NEEDED
Status: DISCONTINUED | OUTPATIENT
Start: 2020-11-18 | End: 2020-11-18 | Stop reason: HOSPADM

## 2020-11-18 RX ORDER — KETOROLAC TROMETHAMINE 15 MG/ML
INJECTION, SOLUTION INTRAMUSCULAR; INTRAVENOUS AS NEEDED
Status: DISCONTINUED | OUTPATIENT
Start: 2020-11-18 | End: 2020-11-18 | Stop reason: HOSPADM

## 2020-11-18 RX ORDER — HYDROCODONE BITARTRATE AND ACETAMINOPHEN 10; 325 MG/1; MG/1
2 TABLET ORAL
Status: DISCONTINUED | OUTPATIENT
Start: 2020-11-18 | End: 2020-11-20 | Stop reason: HOSPADM

## 2020-11-18 RX ORDER — NALOXONE HYDROCHLORIDE 0.4 MG/ML
0.4 INJECTION, SOLUTION INTRAMUSCULAR; INTRAVENOUS; SUBCUTANEOUS AS NEEDED
Status: DISCONTINUED | OUTPATIENT
Start: 2020-11-18 | End: 2020-11-20 | Stop reason: HOSPADM

## 2020-11-18 RX ORDER — HYDROMORPHONE HYDROCHLORIDE 2 MG/ML
0.5 INJECTION, SOLUTION INTRAMUSCULAR; INTRAVENOUS; SUBCUTANEOUS
Status: DISCONTINUED | OUTPATIENT
Start: 2020-11-18 | End: 2020-11-18 | Stop reason: HOSPADM

## 2020-11-18 RX ORDER — HYDROCODONE BITARTRATE AND ACETAMINOPHEN 7.5; 325 MG/1; MG/1
1 TABLET ORAL
Status: DISCONTINUED | OUTPATIENT
Start: 2020-11-18 | End: 2020-11-20 | Stop reason: HOSPADM

## 2020-11-18 RX ORDER — DIPHENHYDRAMINE HYDROCHLORIDE 50 MG/ML
12.5 INJECTION, SOLUTION INTRAMUSCULAR; INTRAVENOUS
Status: DISCONTINUED | OUTPATIENT
Start: 2020-11-18 | End: 2020-11-18 | Stop reason: HOSPADM

## 2020-11-18 RX ORDER — POLYETHYLENE GLYCOL 3350 17 G/17G
17 POWDER, FOR SOLUTION ORAL DAILY
Status: DISCONTINUED | OUTPATIENT
Start: 2020-11-18 | End: 2020-11-20 | Stop reason: HOSPADM

## 2020-11-18 RX ORDER — ONDANSETRON 4 MG/1
4 TABLET, ORALLY DISINTEGRATING ORAL
Status: DISCONTINUED | OUTPATIENT
Start: 2020-11-18 | End: 2020-11-20 | Stop reason: HOSPADM

## 2020-11-18 RX ADMIN — ASPIRIN 325 MG ORAL TABLET 325 MG: 325 PILL ORAL at 08:50

## 2020-11-18 RX ADMIN — HYDROCODONE BITARTRATE AND ACETAMINOPHEN 2 TABLET: 10; 325 TABLET ORAL at 18:20

## 2020-11-18 RX ADMIN — PIPERACILLIN AND TAZOBACTAM 3.38 G: 3; .375 INJECTION, POWDER, LYOPHILIZED, FOR SOLUTION INTRAVENOUS at 01:42

## 2020-11-18 RX ADMIN — ASPIRIN 325 MG ORAL TABLET 325 MG: 325 PILL ORAL at 18:20

## 2020-11-18 RX ADMIN — PIPERACILLIN AND TAZOBACTAM 3.38 G: 3; .375 INJECTION, POWDER, LYOPHILIZED, FOR SOLUTION INTRAVENOUS at 14:22

## 2020-11-18 RX ADMIN — ONDANSETRON 4 MG: 2 INJECTION INTRAMUSCULAR; INTRAVENOUS at 00:25

## 2020-11-18 RX ADMIN — Medication 10 ML: at 22:00

## 2020-11-18 RX ADMIN — Medication 10 ML: at 14:23

## 2020-11-18 RX ADMIN — Medication 1 CAPSULE: at 10:00

## 2020-11-18 RX ADMIN — KETOROLAC TROMETHAMINE 15 MG: 15 INJECTION, SOLUTION INTRAMUSCULAR; INTRAVENOUS at 00:25

## 2020-11-18 RX ADMIN — PIPERACILLIN AND TAZOBACTAM 3.38 G: 3; .375 INJECTION, POWDER, LYOPHILIZED, FOR SOLUTION INTRAVENOUS at 08:50

## 2020-11-18 RX ADMIN — PIPERACILLIN AND TAZOBACTAM 3.38 G: 3; .375 INJECTION, POWDER, LYOPHILIZED, FOR SOLUTION INTRAVENOUS at 20:25

## 2020-11-18 RX ADMIN — PIPERACILLIN AND TAZOBACTAM 3.38 G: 3; .375 INJECTION, POWDER, LYOPHILIZED, FOR SOLUTION INTRAVENOUS at 06:38

## 2020-11-18 RX ADMIN — Medication 10 ML: at 06:00

## 2020-11-18 RX ADMIN — HYDROCODONE BITARTRATE AND ACETAMINOPHEN 2 TABLET: 10; 325 TABLET ORAL at 08:50

## 2020-11-18 RX ADMIN — HYDROCODONE BITARTRATE AND ACETAMINOPHEN 2 TABLET: 10; 325 TABLET ORAL at 01:40

## 2020-11-18 NOTE — ANESTHESIA POSTPROCEDURE EVALUATION
Procedure(s):  AMPUTATION LEFT GREAT TOE/ PROXIMAL PHALANX.    general    Anesthesia Post Evaluation      Multimodal analgesia: multimodal analgesia used between 6 hours prior to anesthesia start to PACU discharge  Patient location during evaluation: PACU  Patient participation: complete - patient participated  Level of consciousness: awake  Pain management: adequate  Airway patency: patent  Anesthetic complications: no  Cardiovascular status: acceptable  Respiratory status: acceptable  Hydration status: acceptable  Post anesthesia nausea and vomiting:  none  Final Post Anesthesia Temperature Assessment:  Normothermia (36.0-37.5 degrees C)      INITIAL Post-op Vital signs:   Vitals Value Taken Time   /80 11/18/2020 12:59 AM   Temp 36.6 °C (97.8 °F) 11/18/2020 12:41 AM   Pulse 66 11/18/2020  1:09 AM   Resp 14 11/18/2020  1:09 AM   SpO2 96 % 11/18/2020  1:09 AM   Vitals shown include unvalidated device data.

## 2020-11-18 NOTE — PROGRESS NOTES
Problem: Amputation  Goal: *Progressive mobility and function  Outcome: Progressing Towards Goal  Goal: *Optimal pain control at patient's stated goal  Outcome: Progressing Towards Goal  Goal: *Absence of infection signs and symptoms  Outcome: Progressing Towards Goal  Goal: *Optimal residual limb healing  Outcome: Progressing Towards Goal

## 2020-11-18 NOTE — INTERVAL H&P NOTE
Update History & Physical 
 
The Patient's History and Physical of November 11/17/2020 7:10 PM  ,  
  was reviewed with the patient and I examined the patient. There was no change. The surgical site was confirmed by the patient and me. Plan:  The risk, benefits, expected outcome, and alternative to the recommended procedure have been discussed with the patient. Patient understands and wants to proceed with the procedure.  
 
Electronically signed by Jose Luis Peguero MD on 11/17/2020 at 7:10 PM

## 2020-11-18 NOTE — PERIOP NOTES
TRANSFER - OUT REPORT:    Verbal report given to Mehreen RN on Helder Miranda  being transferred to [address] for routine post - op       Report consisted of patients Situation, Background, Assessment and   Recommendations(SBAR). Information from the following report(s) SBAR, OR Summary, Procedure Summary, Intake/Output, MAR and Recent Results was reviewed with the receiving nurse. Lines:   Peripheral IV 11/17/20 Right Hand (Active)   Site Assessment Clean, dry, & intact 11/18/20 0041   Phlebitis Assessment 0 11/18/20 0041   Infiltration Assessment 0 11/18/20 0041   Dressing Status Clean, dry, & intact 11/18/20 0041   Dressing Type Transparent 11/18/20 0041   Hub Color/Line Status Pink; Infusing;Patent 11/18/20 0041        Opportunity for questions and clarification was provided.       Patient transported with:   Registered Nurse

## 2020-11-18 NOTE — PROGRESS NOTES
Problem: Mobility Impaired (Adult and Pediatric)  Goal: *Acute Goals and Plan of Care (Insert Text)  Description: Physical Therapy Goals  Initiated 11/18/2020 and to be accomplished within 7 day(s)  1. Patient will move from supine to sit and sit to supine , scoot up and down, and roll side to side in bed with modified independence. 2.  Patient will transfer from bed to chair and chair to bed with modified independence using the least restrictive device. 3.  Patient will perform sit to stand with modified independence. 4.  Patient will ambulate with modified independence for 150 feet with the least restrictive device. 5.  Patient will ascend/descend 3 stairs with unilateral handrail(s) with modified independence. PLOF: Pt reports living in 1 story house with 3 ROGER with handrails. Pt independent in all mobility, lives with wife. No AD in the home. Outcome: Progressing Towards Goal    PHYSICAL THERAPY EVALUATION    Patient: Oma Hendrickson (57 y.o. male)  Date: 11/18/2020  Primary Diagnosis: OA (osteoarthritis) of foot [M19.079]  Procedure(s) (LRB):  AMPUTATION LEFT GREAT TOE/ PROXIMAL PHALANX (Left) 1 Day Post-Op   Precautions:   NWB(L foot )  NWB L foot   PLOF: see above     ASSESSMENT :  Based on the objective data described below, the patient presents with decreased balance reactions, gait deviations and decreased independence in functional mobility. Pt received semi-reclined in bed and agreeable to PT session. Pt reporting he had been walking around the room without an AD independently. Pt educated on weight bearing restrictions, demonstrated RW fitting and SL ambulation with RW. Pt verbalized understanding. Pt completing bed mobility with Aida. Sitting on EOB with independence. Pt able to stand with Aida to RW, ambulating while adhering to weight bearing restrictions x10 feet with CGA. Pt returned to supine in bed with Aida. Pt left with all needs met and call bell in reach.  RN notified, pt given RW to adhere to weight bearing restrictions. Pt would benefit from stair training for safe transition to home. Patient will benefit from skilled intervention to address the above impairments. Patient's rehabilitation potential is considered to be Good  Factors which may influence rehabilitation potential include:   [x]         None noted  []         Mental ability/status  []         Medical condition  []         Home/family situation and support systems  []         Safety awareness  []         Pain tolerance/management  []         Other:      PLAN :  Recommendations and Planned Interventions:   [x]           Bed Mobility Training             []    Neuromuscular Re-Education  [x]           Transfer Training                   []    Orthotic/Prosthetic Training  [x]           Gait Training                          []    Modalities  [x]           Therapeutic Exercises           []    Edema Management/Control  [x]           Therapeutic Activities            [x]    Family Training/Education  [x]           Patient Education  []           Other (comment):    Frequency/Duration: Patient will be followed by physical therapy 1-2 times per day/4-7 days per week to address goals. Discharge Recommendations: Home Health with family support  Further Equipment Recommendations for Discharge: rolling walker, axillary crutches      SUBJECTIVE:   Patient stated Vishal Clancy was getting around with crutches last time I had surgery but this is much easier .    (ambulating with RW)  OBJECTIVE DATA SUMMARY:     Past Medical History:   Diagnosis Date    Left toe amputee [facility])      Past Surgical History:   Procedure Laterality Date    FOOT/TOES SURGERY PROC UNLISTED      HX HEENT  02/2020    Lip excision (lower)     Barriers to Learning/Limitations: None  Compensate with: N/A  Home Situation:  Home Situation  Home Environment: Private residence  # Steps to Enter: 3  Rails to Enter: Yes  One/Two Story Residence: One story  Living Alone: No  Support Systems: Spouse/Significant Other/Partner  Patient Expects to be Discharged to[de-identified] Private residence  Current DME Used/Available at Home: None  Critical Behavior:  Neurologic State: Alert  Orientation Level: Oriented X4  Cognition: Follows commands  Safety/Judgement: Awareness of environment; Fall prevention  Psychosocial  Patient Behaviors: Calm; Cooperative  Skin Condition/Temp: Dry;Warm     Skin Integrity: Incision (comment)(left incision)  Skin Integumentary  Skin Color: Appropriate for ethnicity  Skin Condition/Temp: Dry;Warm  Skin Integrity: Incision (comment)(left incision)     Strength:        WNL    Tone & Sensation:       WNL    Range Of Motion:      WNL     Posture:  Posture (WDL): Within defined limits     Functional Mobility:  Bed Mobility:  Rolling: Modified independent  Supine to Sit: Modified independent  Sit to Supine: Modified independent  Scooting: Modified independent  Transfers:  Sit to Stand: Supervision  Stand to Sit: Supervision    Balance:   Sitting: Intact  Standing: Intact; With support    Ambulation/Gait Training:  Distance (ft): 10 Feet (ft)  Assistive Device: Walker, rolling  Ambulation - Level of Assistance: Contact guard assistance     Gait Description (WDL): Exceptions to WDL  Gait Abnormalities: Step to gait; Decreased step clearance     Left Side Weight Bearing: Non-weight bearing  Base of Support: Center of gravity altered;Shift to right     Speed/Luana: Slow;Shuffled  Step Length: Right shortened;Left shortened    Pain:  Pain level pre-treatment: 10/10   Pain level post-treatment: 10/10   Pain Intervention(s) : Medication (see MAR); Rest, Ice, Repositioning  Response to intervention: Nurse notified, See doc flow    Activity Tolerance:   Fair activity tolerance, no reports of fatigue, limited in ambulation this session  Please refer to the flowsheet for vital signs taken during this treatment.   After treatment:   []         Patient left in no apparent distress sitting up in chair  [x]         Patient left in no apparent distress in bed  [x]         Call bell left within reach  [x]         Nursing notified  []         Caregiver present  []         Bed alarm activated  []         SCDs applied    COMMUNICATION/EDUCATION:   [x]         Role of Physical Therapy in the acute care setting. [x]         Fall prevention education was provided and the patient/caregiver indicated understanding. [x]         Patient/family have participated as able in goal setting and plan of care. [x]         Patient/family agree to work toward stated goals and plan of care. []         Patient understands intent and goals of therapy, but is neutral about his/her participation. []         Patient is unable to participate in goal setting/plan of care: ongoing with therapy staff.  []         Other:     Thank you for this referral.  Bam Fernandes, PT   Time Calculation: 12 mins      Eval Complexity: History: LOW Complexity : Zero comorbidities / personal factors that will impact the outcome / POCExam:LOW Complexity : 1-2 Standardized tests and measures addressing body structure, function, activity limitation and / or participation in recreation  Presentation: LOW Complexity : Stable, uncomplicated  Clinical Decision Making:Low Complexity low  Overall Complexity:LOW

## 2020-11-18 NOTE — CONSULTS
Infectious Disease Consultation Note        Reason: Left great toe infection    Current abx Prior abx   Piperacillin/tazobactam since 11/17/2020 Ciprofloxacin     Lines:       Assessment :    H/o  left great toe distal phalanx IP joint disarticulation amputation surgery many years ago. Now with erythema proximal left great toe  Clinical presentation c/w osteomyelitis of the left great toe distal half of the proximal phalanx s/p amputation of left great toe proximal phalanx on 11/17/20. Grossly clean margins achieved at surgery. Intra op cultures: pending    preop wound culture 9/29/20- MSSA, group b strep, Enterobacter, Klebsiella status post ciprofloxacin (last dose day prior to surgery)    Recent outpatient antibiotic can interfere with current culture results. Will need to select antibiotics keeping this in mind. Antibiotic management complicated due to history of allergy to trimethoprim/sulfa      Recommendations:    1. Recommend to use piperacillin/tazobactam.  Discontinue ceftriaxone  2. Follow-up Intra-Op cultures  3. We will switch patient to appropriate oral antibiotics based on the Intra-Op cultures, clinical course      Thank you for consultation request. Above plan was discussed in details with patient, and dr Nas Villar. Please call me if any further questions or concerns. Will continue to participate in the care of this patient. HPI:     44 y.o. male  with history of osteomyelitis of the distal phalanx of left great toe in March 2015 admitted to [facility] on 11/17/2020 with increasing redness left great toe. Patient was relatively well till September 2020. That time he noticed some drainage from the left great toe proximal phalanx. He was seen in Dr. Jasbir Ortiz office. Wound culture 9/29/20- MSSA, group b strep, Enterobacter, Klebsiella. He was prescribed ciprofloxacin for 2 weeks. About 10 days ago he started noticing increasing redness left great toe.   He was seen again by .  He was restarted on ciprofloxacin about a week ago. MRI 11/12- Osteomyelitis of the first proximal phalanx. Patient was admitted for surgical intervention. I have been consulted for further recommendations. Intra op cultures: pending. Patient denies any redness of his left foot throughout this time. No subjective fever/chills. No h/o mrsa colonization or infection. Past Medical History:   Diagnosis Date    Left toe amputee [facility])        Past Surgical History:   Procedure Laterality Date    FOOT/TOES SURGERY PROC UNLISTED      HX HEENT  02/2020    Lip excision (lower)       home Medication List    Details   !! HYDROcodone-acetaminophen (NORCO) 7.5-325 mg per tablet Take 2 Tabs by mouth every six (6) hours as needed for Pain for up to 14 days. Max Daily Amount: 8 Tabs. One to two po every 4 to 6 hrs prn pain  Qty: 28 Tab, Refills: 0    Associated Diagnoses: Other chronic osteomyelitis of left foot (HCC)       Details   ciprofloxacin HCl (CIPRO) 750 mg tablet Take 1 Tab by mouth two (2) times a day. Qty: 14 Tab, Refills: 1    Associated Diagnoses: Open wound of toe, initial encounter      !! HYDROcodone-acetaminophen (NORCO) 7.5-325 mg per tablet Take 1-2 Tabs by mouth every eight (8) hours as needed for Pain (AFTER SURGERY, NOT BEFORE) for up to 7 days. Max Daily Amount: 6 Tabs. Qty: 42 Tab, Refills: 0    Associated Diagnoses: Other chronic osteomyelitis of left foot ([address] Pre-operative examination      ondansetron (ZOFRAN ODT) 4 mg disintegrating tablet Take 1 Tab by mouth every eight (8) hours as needed for Nausea or Vomiting. Indications: prevent nausea and vomiting after surgery  Qty: 30 Tab, Refills: 0      aspirin (ASPIRIN) 325 mg tablet Take 1 Tab by mouth two (2) times daily (after meals). Qty: 60 Tab, Refills: 1      polyethylene glycol (Miralax) 17 gram packet Take 1 Packet by mouth daily.   Qty: 10 Packet, Refills: 1          Current Facility-Administered Medications   Medication Dose Route Frequency  ondansetron (ZOFRAN ODT) tablet 4 mg  4 mg Oral Q8H PRN    polyethylene glycol (MIRALAX) packet 17 g  17 g Oral DAILY    aspirin tablet 325 mg  325 mg Oral BIDPC    sodium chloride (NS) flush 5-40 mL  5-40 mL IntraVENous Q8H    sodium chloride (NS) flush 5-40 mL  5-40 mL IntraVENous PRN    naloxone (NARCAN) injection 0.4 mg  0.4 mg IntraVENous PRN    HYDROcodone-acetaminophen (NORCO) 7.5-325 mg per tablet 1 Tab  1 Tab Oral Q4H PRN    HYDROcodone-acetaminophen (NORCO)  mg tablet 2 Tab  2 Tab Oral Q4H PRN    ondansetron (ZOFRAN) injection 4 mg  4 mg IntraVENous Q4H PRN    cefTRIAXone (ROCEPHIN) 1 g in sterile water (preservative free) 10 mL IV syringe  1 g IntraVENous Q12H    piperacillin-tazobactam (ZOSYN) 3.375 g in 0.9% sodium chloride (MBP/ADV) 100 mL MBP  3.375 g IntraVENous Q6H       Allergies: Bactrim [sulfamethoprim ds]    Family History   Problem Relation Age of Onset    Cancer Father     Diabetes Brother     Heart Disease Other     Hypertension Neg Hx     Stroke Neg Hx      Social History     Socioeconomic History    Marital status:      Spouse name: Not on file    Number of children: Not on file    Years of education: Not on file    Highest education level: Not on file   Occupational History    Not on file   Social Needs    Financial resource strain: Not on file    Food insecurity     Worry: Not on file     Inability: Not on file   [employer] needs     Medical: Not on file     Non-medical: Not on file   Tobacco Use    Smoking status: Former Smoker    Smokeless tobacco: Never Used   Substance and Sexual Activity    Alcohol use: Yes     Comment: social    Drug use: Not Currently     Types: Marijuana     Comment: last 2005    Sexual activity: Not on file   Lifestyle    Physical activity     Days per week: Not on file     Minutes per session: Not on file    Stress: Not on file   Relationships    Social connections     Talks on phone: Not on file     Gets together: Not on file     Attends Advent service: Not on file     Active member of club or organization: Not on file     Attends meetings of clubs or organizations: Not on file     Relationship status: Not on file    Intimate partner violence     Fear of current or ex partner: Not on file     Emotionally abused: Not on file     Physically abused: Not on file     Forced sexual activity: Not on file   Other Topics Concern    Not on file   Social History Narrative    Not on file     Social History     Tobacco Use   Smoking Status Former Smoker   Smokeless Tobacco Never Used        Temp (24hrs), Av.7 °F (36.5 °C), Min:97 °F (36.1 °C), Max:98.2 °F (36.8 °C)    Visit Vitals  /76 (BP 1 Location: Left arm, BP Patient Position: At rest)   Pulse 76   Temp 97 °F (36.1 °C)   Resp 15   Ht 6' 2\" (1.88 m)   Wt 104.3 kg (230 lb)   SpO2 97%   BMI 29.53 kg/m²       ROS: 12 point ROS obtained in details. Pertinent positives as mentioned in HPI,   otherwise negative    Physical Exam:    General: Well developed, well nourished male sitting on the  bed AAOx3 in no acute distress. General:   awake alert and oriented   HEENT:  Normocephalic, atraumatic,  EOMI, no scleral icterus or pallor; no conjunctival hemmohage;  nasal and oral mucous are moist and without evidence of lesions. Neck supple, no bruits. Lymph Nodes:   no cervical, axillary or inguinal adenopathy   Lungs:   non-labored, bilaterally clear to auscultation- no crackles wheezes rales or rhonchi   Heart:  RRR, s1 and s2; no rubs or gallops, no edema   Abdomen:  soft, non-distended, active bowel sounds, no hepatomegaly, no splenomegaly. Non-tender   Genitourinary:  deferred   Extremities:   no clubbing, cyanosis; no joint effusions or swelling;  Full ROM of all large joints to the upper and lower extremities; muscle mass appropriate for age; left foot surgical dressing not opened   Neurologic:  No gross focal sensory abnormalities; 5/5 muscle strength to upper and lower extremities. Speech appropriate. Cranial nerves intact                        Skin:  No rash or ulcers noted   Back:  no spinal or paraspinal muscle tenderness or rigidity, no CVA tenderness     Psychiatric:  No suicidal or homicidal ideations, appropriate mood and affect         Labs: Results:   Chemistry Recent Labs     11/16/20  1447   *      K 4.0      CO2 26   BUN 13   CREA 1.08   CA 8.8   AGAP 9   BUCR 12      TP 7.5   ALB 3.5   GLOB 4.0   AGRAT 0.9      CBC w/Diff No results for input(s): WBC, RBC, HGB, HCT, PLT, GRANS, LYMPH, EOS, HGBEXT, HCTEXT, PLTEXT in the last 72 hours.    Microbiology Recent Labs     11/17/20  1142   CULT PENDING          RADIOLOGY:    All available imaging studies/reports in [facility] for this admission were reviewed    Dr. Treasa Heimlich, Infectious Disease Specialist  [phone]  November 18, 2020  8:38 AM

## 2020-11-18 NOTE — ANESTHESIA PREPROCEDURE EVALUATION
Relevant Problems   No relevant active problems       Anesthetic History   No history of anesthetic complications            Review of Systems / Medical History  Patient summary reviewed and pertinent labs reviewed    Pulmonary  Within defined limits                 Neuro/Psych   Within defined limits           Cardiovascular  Within defined limits                     GI/Hepatic/Renal  Within defined limits              Endo/Other  Within defined limits      Obesity     Other Findings              Physical Exam    Airway  Mallampati: II  TM Distance: 4 - 6 cm  Neck ROM: normal range of motion   Mouth opening: Normal     Cardiovascular               Dental         Pulmonary                 Abdominal  GI exam deferred       Other Findings            Anesthetic Plan    ASA: 2, emergent  Anesthesia type: general          Induction: Intravenous  Anesthetic plan and risks discussed with: Patient

## 2020-11-18 NOTE — PROGRESS NOTES
PROGRESS NOTE      Patient: Helder Miranda  MRN: [id number]  SSN: [id number]   YOB: 1970  Age: 44 y.o. Sex: male     Admit Date: 11/17/2020 LOS:  LOS: 0 days      POD # 1 S/P Procedure(s):  AMPUTATION LEFT GREAT TOE/ PROXIMAL PHALANX  SEPTIC LEFT GREAT TOE MTP      ASSESSMENT/PLAN     Helder Miranda is  Resting at this time. Pain controlled. Dressings are in place// I await Cultures results. . DC to home anticipated on  11/19 or 11/20/2020.    1. Orthopaedic:  A. Pain Meds : Norco Rx written  B.  DVT Prophylaxis: SCD's,  mg one po each day// start  11/18/2020. C.  IV AB: Antibiotics:  Zosyn // Rocephin IV AB yes for great toe infection (LEFT)  D. Weight Bear: PWB left heel  E.  Physician's following patient: Consultants following patients: Infectious Disease    Yes consulted and following patient  F.  PT/OT/ Intervention/ Consults:    PT/OT : [x]PT / [] OT ordered // [address] [facility] disposition:  ,      [x]  []  [] [address]       Visit Vitals  /76 (BP 1 Location: Left arm, BP Patient Position: At rest)   Pulse 76   Temp 97 °F (36.1 °C)   Resp 15   Ht 6' 2\" (1.88 m)   Wt 230 lb (104.3 kg)   SpO2 97%   BMI 29.53 kg/m²       Appearance: Alert, well appearing and pleasant patient who is in no distress, oriented to person, place/time, and who follows commands. This patient is accompanied in the examination room by his  self. no dementia  Psychiatric: Affect and mood are appropriate. Respiratory: Breathing is unlabored without accessory chest muscle use  Peripheral Vascular: Normal Pulses to each hand and foot    EXTREMITY LEFT FOOT    DRESSINGS: Clean,dry , intact    INCISIONS:  dressings covering the incision  not assessed)     Extremities:        No embolic phenomena to the toes               No significant edema to the foot and or toes.                 Pulses in tact to the left and right foot Lower extremities are warm and appear well perfused         DVT: No evidence of DVT seen on examination at this time            Moves lower extremities well (ankle DF/PF bilateral)         Moves Upper extremities well      LABS AND MEDICATION REVIEW      CMP: No results found for: NA, K, CL, CO2, AGAP, GLU, BUN, CREA, GFRAA, GFRNA, CA, MG, PHOS, ALB, TBIL, TP, ALB, GLOB, AGRAT, ALT  CBC: No results found for: WBC, HGB, HGBEXT, HCT, HCTEXT, PLT, PLTEXT, HGBEXT, HCTEXT, PLTEXT  COAGS: No results found for: APTT, PTP, INR, INREXT, INREXT     Results     Procedure Component Value Units Date/Time    CULTURE, ANAEROBIC [[id number]]     Order Status:  Sent Specimen:   Toe     CULTURE, TISSUE Amaris Lobe STAIN [[id number]]     Order Status:  Sent Specimen:  Great Toe            Current Facility-Administered Medications   Medication Dose Route Frequency    ondansetron (ZOFRAN ODT) tablet 4 mg  4 mg Oral Q8H PRN    polyethylene glycol (MIRALAX) packet 17 g  17 g Oral DAILY    aspirin tablet 325 mg  325 mg Oral BIDPC    sodium chloride (NS) flush 5-40 mL  5-40 mL IntraVENous Q8H    sodium chloride (NS) flush 5-40 mL  5-40 mL IntraVENous PRN    naloxone (NARCAN) injection 0.4 mg  0.4 mg IntraVENous PRN    HYDROcodone-acetaminophen (NORCO) 7.5-325 mg per tablet 1 Tab  1 Tab Oral Q4H PRN    HYDROcodone-acetaminophen (NORCO)  mg tablet 2 Tab  2 Tab Oral Q4H PRN    ondansetron (ZOFRAN) injection 4 mg  4 mg IntraVENous Q4H PRN    cefTRIAXone (ROCEPHIN) 1 g in sterile water (preservative free) 10 mL IV syringe  1 g IntraVENous Q12H    piperacillin-tazobactam (ZOSYN) 3.375 g in 0.9% sodium chloride (MBP/ADV) 100 mL MBP  3.375 g IntraVENous Q6H          REVIEW OF SYSTEMS : 11/18/2020  ALL BELOW ARE Negative except : SEE HPI        Past Medical History:   Diagnosis Date    Left toe amputee [facility])       Past Surgical History:   Procedure Laterality Date    FOOT/TOES SURGERY PROC UNLISTED      HX HEENT  02/2020    Lip excision (lower)      Social History     Socioeconomic History    Marital status:      Spouse name: Not on file    Number of children: Not on file    Years of education: Not on file    Highest education level: Not on file   Occupational History    Not on file   Social Needs    Financial resource strain: Not on file    Food insecurity     Worry: Not on file     Inability: Not on file    Transportation needs     Medical: Not on file     Non-medical: Not on file   Tobacco Use    Smoking status: Former Smoker    Smokeless tobacco: Never Used   Substance and Sexual Activity    Alcohol use: Yes     Comment: social    Drug use: Not Currently     Types: Marijuana     Comment: last 2005    Sexual activity: Not on file   Lifestyle    Physical activity     Days per week: Not on file     Minutes per session: Not on file    Stress: Not on file   Relationships    Social connections     Talks on phone: Not on file     Gets together: Not on file     Attends Congregation service: Not on file     Active member of club or organization: Not on file     Attends meetings of clubs or organizations: Not on file     Relationship status: Not on file    Intimate partner violence     Fear of current or ex partner: Not on file     Emotionally abused: Not on file     Physically abused: Not on file     Forced sexual activity: Not on file   Other Topics Concern    Not on file   Social History Narrative    Not on file      Family History   Problem Relation Age of Onset    Cancer Father     Diabetes Brother     Heart Disease Other     Hypertension Neg Hx     Stroke Neg Hx      Prior to Admission medications    Medication Sig Start Date End Date Taking? Authorizing Provider   naproxen sodium (Aleve) 220 mg tablet Take 220 mg by mouth as needed. Yes Provider, Historical   HYDROcodone-acetaminophen (NORCO) 7.5-325 mg per tablet Take 2 Tabs by mouth every six (6) hours as needed for Pain for up to 14 days.  Max Daily Amount: 8 Tabs. One to two po every 4 to 6 hrs prn pain 11/17/20 12/1/20 Yes Tyrese Devries MD   ciprofloxacin HCl (CIPRO) 750 mg tablet Take 1 Tab by mouth two (2) times a day. 11/11/20  Yes Govind Mcfarland MD   silver-foam bandage (AQUACEL AG FOAM) 1.2 %- 3.2\" X 3.2\" bndg 1 Each by Apply Externally route daily. 9/6/16  Yes Tom Fleming PA-C   HYDROcodone-acetaminophen (NORCO) 7.5-325 mg per tablet Take 1-2 Tabs by mouth every eight (8) hours as needed for Pain (AFTER SURGERY, NOT BEFORE) for up to 7 days. Max Daily Amount: 6 Tabs. 11/16/20 11/23/20  Tom Fleming PA-C   ondansetron (ZOFRAN ODT) 4 mg disintegrating tablet Take 1 Tab by mouth every eight (8) hours as needed for Nausea or Vomiting. Indications: prevent nausea and vomiting after surgery 11/16/20   Tom Fleming PA-C   aspirin (ASPIRIN) 325 mg tablet Take 1 Tab by mouth two (2) times daily (after meals). 11/16/20   Tom Fleming PA-C   polyethylene glycol (Miralax) 17 gram packet Take 1 Packet by mouth daily.  11/16/20   Tom Fleming PA-C     Current Facility-Administered Medications   Medication Dose Route Frequency    ondansetron (ZOFRAN ODT) tablet 4 mg  4 mg Oral Q8H PRN    polyethylene glycol (MIRALAX) packet 17 g  17 g Oral DAILY    aspirin tablet 325 mg  325 mg Oral BIDPC    sodium chloride (NS) flush 5-40 mL  5-40 mL IntraVENous Q8H    sodium chloride (NS) flush 5-40 mL  5-40 mL IntraVENous PRN    naloxone (NARCAN) injection 0.4 mg  0.4 mg IntraVENous PRN    HYDROcodone-acetaminophen (NORCO) 7.5-325 mg per tablet 1 Tab  1 Tab Oral Q4H PRN    HYDROcodone-acetaminophen (NORCO)  mg tablet 2 Tab  2 Tab Oral Q4H PRN    ondansetron (ZOFRAN) injection 4 mg  4 mg IntraVENous Q4H PRN    cefTRIAXone (ROCEPHIN) 1 g in sterile water (preservative free) 10 mL IV syringe  1 g IntraVENous Q12H    piperacillin-tazobactam (ZOSYN) 3.375 g in 0.9% sodium chloride (MBP/ADV) 100 mL MBP  3.375 g IntraVENous Q6H     Allergies   Allergen Reactions    Bactrim [Sulfamethoprim Ds] Rash            . Lupillo Grace MD  11/18/2020  6:53 AM

## 2020-11-18 NOTE — PROGRESS NOTES
Problem: Falls - Risk of  Goal: *Absence of Falls  Description: Document Farnaz Usha Fall Risk and appropriate interventions in the flowsheet.   Outcome: Progressing Towards Goal  Note: Fall Risk Interventions:  Mobility Interventions: Patient to call before getting OOB, Utilize walker, cane, or other assistive device         Medication Interventions: Patient to call before getting OOB

## 2020-11-19 PROCEDURE — 74011250637 HC RX REV CODE- 250/637: Performed by: ORTHOPAEDIC SURGERY

## 2020-11-19 PROCEDURE — 74011250636 HC RX REV CODE- 250/636: Performed by: ORTHOPAEDIC SURGERY

## 2020-11-19 PROCEDURE — 2709999900 HC NON-CHARGEABLE SUPPLY

## 2020-11-19 PROCEDURE — 65270000029 HC RM PRIVATE

## 2020-11-19 PROCEDURE — 74011250637 HC RX REV CODE- 250/637: Performed by: INTERNAL MEDICINE

## 2020-11-19 PROCEDURE — 74011000258 HC RX REV CODE- 258: Performed by: ORTHOPAEDIC SURGERY

## 2020-11-19 RX ORDER — AMOXICILLIN AND CLAVULANATE POTASSIUM 875; 125 MG/1; MG/1
1 TABLET, FILM COATED ORAL EVERY 12 HOURS
Status: DISCONTINUED | OUTPATIENT
Start: 2020-11-19 | End: 2020-11-20 | Stop reason: HOSPADM

## 2020-11-19 RX ORDER — DIPHENHYDRAMINE HYDROCHLORIDE 50 MG/ML
25 INJECTION, SOLUTION INTRAMUSCULAR; INTRAVENOUS
Status: DISCONTINUED | OUTPATIENT
Start: 2020-11-19 | End: 2020-11-20 | Stop reason: HOSPADM

## 2020-11-19 RX ORDER — SULFAMETHOXAZOLE AND TRIMETHOPRIM 800; 160 MG/1; MG/1
1 TABLET ORAL EVERY 12 HOURS
Status: DISCONTINUED | OUTPATIENT
Start: 2020-11-19 | End: 2020-11-20 | Stop reason: HOSPADM

## 2020-11-19 RX ADMIN — ASPIRIN 325 MG ORAL TABLET 325 MG: 325 PILL ORAL at 18:12

## 2020-11-19 RX ADMIN — AMOXICILLIN AND CLAVULANATE POTASSIUM 1 TABLET: 875; 125 TABLET, FILM COATED ORAL at 13:46

## 2020-11-19 RX ADMIN — HYDROCODONE BITARTRATE AND ACETAMINOPHEN 2 TABLET: 10; 325 TABLET ORAL at 18:12

## 2020-11-19 RX ADMIN — Medication 10 ML: at 13:47

## 2020-11-19 RX ADMIN — Medication 10 ML: at 22:00

## 2020-11-19 RX ADMIN — SULFAMETHOXAZOLE AND TRIMETHOPRIM 1 TABLET: 800; 160 TABLET ORAL at 20:38

## 2020-11-19 RX ADMIN — PIPERACILLIN AND TAZOBACTAM 3.38 G: 3; .375 INJECTION, POWDER, LYOPHILIZED, FOR SOLUTION INTRAVENOUS at 01:24

## 2020-11-19 RX ADMIN — Medication 1 CAPSULE: at 09:00

## 2020-11-19 RX ADMIN — POLYETHYLENE GLYCOL 3350 17 G: 17 POWDER, FOR SOLUTION ORAL at 08:41

## 2020-11-19 RX ADMIN — PIPERACILLIN AND TAZOBACTAM 3.38 G: 3; .375 INJECTION, POWDER, LYOPHILIZED, FOR SOLUTION INTRAVENOUS at 08:41

## 2020-11-19 RX ADMIN — HYDROCODONE BITARTRATE AND ACETAMINOPHEN 1 TABLET: 7.5; 325 TABLET ORAL at 01:28

## 2020-11-19 RX ADMIN — Medication 10 ML: at 08:42

## 2020-11-19 RX ADMIN — SULFAMETHOXAZOLE AND TRIMETHOPRIM 1 TABLET: 800; 160 TABLET ORAL at 13:46

## 2020-11-19 RX ADMIN — HYDROCODONE BITARTRATE AND ACETAMINOPHEN 2 TABLET: 10; 325 TABLET ORAL at 08:44

## 2020-11-19 RX ADMIN — AMOXICILLIN AND CLAVULANATE POTASSIUM 1 TABLET: 875; 125 TABLET, FILM COATED ORAL at 20:38

## 2020-11-19 RX ADMIN — ASPIRIN 325 MG ORAL TABLET 325 MG: 325 PILL ORAL at 08:41

## 2020-11-19 NOTE — PROGRESS NOTES
Problem: Amputation  Goal: *Progressive mobility and function  Outcome: Progressing Towards Goal  Goal: *Optimal pain control at patient's stated goal  Outcome: Progressing Towards Goal  Goal: *Absence of infection signs and symptoms  Outcome: Progressing Towards Goal  Goal: *Optimal residual limb healing  Outcome: Progressing Towards Goal     Problem: Patient Education: Go to Patient Education Activity  Goal: Patient/Family Education  Outcome: Progressing Towards Goal     Problem: Falls - Risk of  Goal: *Absence of Falls  Description: Document Kaleb Lopez Fall Risk and appropriate interventions in the flowsheet.   Outcome: Progressing Towards Goal  Note: Fall Risk Interventions:  Mobility Interventions: Patient to call before getting OOB         Medication Interventions: Patient to call before getting OOB                   Problem: Patient Education: Go to Patient Education Activity  Goal: Patient/Family Education  Outcome: Progressing Towards Goal     Problem: Patient Education: Go to Patient Education Activity  Goal: Patient/Family Education  Outcome: Progressing Towards Goal     Problem: Pain - Acute  Goal: *Control of acute pain  Outcome: Progressing Towards Goal     Problem: Patient Education: Go to Patient Education Activity  Goal: Patient/Family Education  Outcome: Progressing Towards Goal

## 2020-11-19 NOTE — PROGRESS NOTES
PROGRESS NOTE      Patient: Yessi House  MRN: [id number]  SSN: [id number]   YOB: 1970  Age: 44 y.o. Sex: male     Admit Date: 11/17/2020 LOS:  LOS: 1 day      POD # 1 S/P Procedure(s):  AMPUTATION LEFT GREAT TOE/ PROXIMAL PHALANX  SEPTIC LEFT GREAT TOE MTP      ASSESSMENT/PLAN     Yessi House is  Resting at this time. Pain controlled. Dressings are in place// I await Cultures results. [address] ON 11/20/2020. LESS ERYTHEMA TO THE incision and great toe. 1. Orthopaedic:  A. Pain Meds : Norco Rx written  B.  DVT Prophylaxis: SCD's,  mg one po each day// start  11/18/2020. C.  IV AB: Antibiotics:   ID RECOMMENDATIONS    Recommendations:    Dr Emily Tyler MD (ID service)      1.  D/c piperacillin/tazobactam.  Start po Augmentin, bactrim (monitor for allergic reaction)  2. Follow-up Intra-Op cultures  3. Hopefully d/c patient in am on bactrim/Augmentin till 11/29/20 if able to tolerate Bactrim without difficulties. 4.  Benadryl as needed     D. Weight Bear: PWB left heel  E.  Physician's following patient: Consultants following patients: Infectious Disease    Yes consulted and following patient  F.  PT/OT/ Intervention/ Consults:    PT/OT : [x]PT / [] OT ordered // [facility] disposition:  ,      [x]  []  [] [address]       Visit Vitals  /71 (BP 1 Location: Left arm, BP Patient Position: At rest)   Pulse 64   Temp 97.3 °F (36.3 °C)   Resp 18   Ht 6' 2\" (1.88 m)   Wt 230 lb (104.3 kg)   SpO2 95%   BMI 29.53 kg/m²       Appearance: Alert, well appearing and pleasant patient who is in no distress, oriented to person, place/time, and who follows commands. This patient is accompanied in the examination room by his  self. no dementia  Psychiatric: Affect and mood are appropriate.    Respiratory: Breathing is unlabored without accessory chest muscle use  Peripheral Vascular: Normal Pulses to each hand and foot    EXTREMITY LEFT FOOT    DRESSINGS: Clean,dry , intact // DRESSINGS CHANGED// DRAIN removed by me. INCISIONS:   Incision looks good at this point// LESS ERYTHEMA TO THE incision and great toe. Extremities:        No embolic phenomena to the toes               No significant edema to the foot and or toes.                 Pulses in tact to the left               Lower extremities are warm and appear well perfused         DVT: No evidence of DVT seen on examination at this time            Moves lower extremities well (ankle DF/PF bilateral)         Moves Upper extremities well      LABS AND MEDICATION REVIEW      CMP: No results found for: NA, K, CL, CO2, AGAP, GLU, BUN, CREA, GFRAA, GFRNA, CA, MG, PHOS, ALB, TBIL, TP, ALB, GLOB, AGRAT, ALT  CBC: No results found for: WBC, HGB, HGBEXT, HCT, HCTEXT, PLT, PLTEXT, HGBEXT, HCTEXT, PLTEXT  COAGS: No results found for: APTT, PTP, INR, INREXT, INREXT     Results     Procedure Component Value Units Date/Time    CULTURE, TISSUE Huyworth Raguelles STAIN [[id number]] Collected:  11/17/20 2345    Order Status:  Canceled Specimen:  Vickie Right     CULTURE, ANAEROBIC [[id number]] Collected:  11/17/20 1142    Order Status:  Completed Specimen:  Toe Updated:  11/18/20 0706     Special Requests: LEFT GREAT DEEP     Culture result: PENDING    CULTURE, Johana Hernandez STAIN [[id number]] Collected:  11/17/20 1142    Order Status:  Completed Specimen:  Toe Updated:  11/19/20 1057     Special Requests: LEFT GREAT DEEP     GRAM STAIN RARE WBCS SEEN         NO ORGANISMS SEEN        Culture result: NO GROWTH AFTER 19 HOURS              Current Facility-Administered Medications   Medication Dose Route Frequency    amoxicillin-clavulanate (AUGMENTIN) 875-125 mg per tablet 1 Tab  1 Tab Oral Q12H    trimethoprim-sulfamethoxazole (BACTRIM DS, SEPTRA DS) 160-800 mg per tablet 1 Tab  1 Tab Oral Q12H    diphenhydrAMINE (BENADRYL) injection 25 mg  25 mg IntraVENous Q4H PRN    ondansetron (ZOFRAN ODT) tablet 4 mg  4 mg Oral Q8H PRN    polyethylene glycol (MIRALAX) packet 17 g  17 g Oral DAILY    aspirin tablet 325 mg  325 mg Oral BIDPC    sodium chloride (NS) flush 5-40 mL  5-40 mL IntraVENous Q8H    sodium chloride (NS) flush 5-40 mL  5-40 mL IntraVENous PRN    naloxone (NARCAN) injection 0.4 mg  0.4 mg IntraVENous PRN    HYDROcodone-acetaminophen (NORCO) 7.5-325 mg per tablet 1 Tab  1 Tab Oral Q4H PRN    HYDROcodone-acetaminophen (NORCO)  mg tablet 2 Tab  2 Tab Oral Q4H PRN    ondansetron (ZOFRAN) injection 4 mg  4 mg IntraVENous Q4H PRN    lactobacillus sp. 50 billion cpu (BIO-K PLUS) capsule 1 Cap  1 Cap Oral DAILY          REVIEW OF SYSTEMS : 11/19/2020  ALL BELOW ARE Negative except : SEE HPI        Past Medical History:   Diagnosis Date    Left toe amputee [facility])       Past Surgical History:   Procedure Laterality Date    FOOT/TOES SURGERY PROC UNLISTED      HX HEENT  02/2020    Lip excision (lower)      Social History     Socioeconomic History    Marital status:      Spouse name: Not on file    Number of children: Not on file    Years of education: Not on file    Highest education level: Not on file   Occupational History    Not on file   Social Needs    Financial resource strain: Not on file    Food insecurity     Worry: Not on file     Inability: Not on file   [employer] needs     Medical: Not on file     Non-medical: Not on file   Tobacco Use    Smoking status: Former Smoker    Smokeless tobacco: Never Used   Substance and Sexual Activity    Alcohol use: Yes     Comment: social    Drug use: Not Currently     Types: Marijuana     Comment: last 2005    Sexual activity: Not on file   Lifestyle    Physical activity     Days per week: Not on file     Minutes per session: Not on file    Stress: Not on file   Relationships    Social connections     Talks on phone: Not on file     Gets together: Not on file     Attends [employer] service: Not on file     Active member of club or organization: Not on file     Attends meetings of clubs or organizations: Not on file     Relationship status: Not on file    Intimate partner violence     Fear of current or ex partner: Not on file     Emotionally abused: Not on file     Physically abused: Not on file     Forced sexual activity: Not on file   Other Topics Concern    Not on file   Social History Narrative    Not on file      Family History   Problem Relation Age of Onset    Cancer Father     Diabetes Brother     Heart Disease Other     Hypertension Neg Hx     Stroke Neg Hx      Prior to Admission medications    Medication Sig Start Date End Date Taking? Authorizing Provider   naproxen sodium (Aleve) 220 mg tablet Take 220 mg by mouth as needed. Yes Provider, Historical   HYDROcodone-acetaminophen (NORCO) 7.5-325 mg per tablet Take 2 Tabs by mouth every six (6) hours as needed for Pain for up to 14 days. Max Daily Amount: 8 Tabs. One to two po every 4 to 6 hrs prn pain 11/17/20 12/1/20 Yes Sanjuanita Devries MD   ciprofloxacin HCl (CIPRO) 750 mg tablet Take 1 Tab by mouth two (2) times a day. 11/11/20  Yes Mitzi Bennett MD   silver-foam bandage (AQUACEL AG FOAM) 1.2 %- 3.2\" X 3.2\" bndg 1 Each by Apply Externally route daily. 9/6/16  Yes Robbie Pack PA-C   HYDROcodone-acetaminophen (NORCO) 7.5-325 mg per tablet Take 1-2 Tabs by mouth every eight (8) hours as needed for Pain (AFTER SURGERY, NOT BEFORE) for up to 7 days. Max Daily Amount: 6 Tabs. 11/16/20 11/23/20  Robbie Pack PA-C   ondansetron (ZOFRAN ODT) 4 mg disintegrating tablet Take 1 Tab by mouth every eight (8) hours as needed for Nausea or Vomiting. Indications: prevent nausea and vomiting after surgery 11/16/20   Robbie Pack PA-C   aspirin (ASPIRIN) 325 mg tablet Take 1 Tab by mouth two (2) times daily (after meals). 11/16/20   Robbie Pack PA-C   polyethylene glycol (Miralax) 17 gram packet Take 1 Packet by mouth daily.  11/16/20 Robbie Pack PA-C     Current Facility-Administered Medications   Medication Dose Route Frequency    amoxicillin-clavulanate (AUGMENTIN) 875-125 mg per tablet 1 Tab  1 Tab Oral Q12H    trimethoprim-sulfamethoxazole (BACTRIM DS, SEPTRA DS) 160-800 mg per tablet 1 Tab  1 Tab Oral Q12H    diphenhydrAMINE (BENADRYL) injection 25 mg  25 mg IntraVENous Q4H PRN    ondansetron (ZOFRAN ODT) tablet 4 mg  4 mg Oral Q8H PRN    polyethylene glycol (MIRALAX) packet 17 g  17 g Oral DAILY    aspirin tablet 325 mg  325 mg Oral BIDPC    sodium chloride (NS) flush 5-40 mL  5-40 mL IntraVENous Q8H    sodium chloride (NS) flush 5-40 mL  5-40 mL IntraVENous PRN    naloxone (NARCAN) injection 0.4 mg  0.4 mg IntraVENous PRN    HYDROcodone-acetaminophen (NORCO) 7.5-325 mg per tablet 1 Tab  1 Tab Oral Q4H PRN    HYDROcodone-acetaminophen (NORCO)  mg tablet 2 Tab  2 Tab Oral Q4H PRN    ondansetron (ZOFRAN) injection 4 mg  4 mg IntraVENous Q4H PRN    lactobacillus sp. 50 billion cpu (BIO-K PLUS) capsule 1 Cap  1 Cap Oral DAILY     Allergies   Allergen Reactions    Bactrim [Sulfamethoprim Ds] Rash            . Deo Renteria MD  11/19/2020  6:53 AM

## 2020-11-19 NOTE — PROGRESS NOTES
Problem: Amputation  Goal: *Progressive mobility and function  Outcome: Progressing Towards Goal     Problem: Amputation  Goal: *Absence of infection signs and symptoms  Outcome: Progressing Towards Goal     Problem: Falls - Risk of  Goal: *Absence of Falls  Description: Document Dong Fall Risk and appropriate interventions in the flowsheet.   Outcome: Progressing Towards Goal  Note: Fall Risk Interventions:  Mobility Interventions: Patient to call before getting OOB, Utilize walker, cane, or other assistive device         Medication Interventions: Patient to call before getting OOB

## 2020-11-19 NOTE — PROGRESS NOTES
PT treatment attempted, pt reporting independent with RW in room and hoffman and able to adhere to weight bearing restrictions. Observed Aida bed mobility and sit to stand to RW. Pt reporting he was comfortable with stair negotiation, with wife assisting as needed. Pt again educated on proper RW fitting. Pt reporting he does not require inpatient PT at this time. Will sign off. Continue to recommend RW and Home Health for safety check. Thank you for this referral     Keira Andrews PT DPT.

## 2020-11-19 NOTE — PROGRESS NOTES
Infectious Disease progress note        Reason: Left great toe infection    Current abx Prior abx   Piperacillin/tazobactam since 11/17/2020 Ciprofloxacin     Lines:       Assessment :    H/o  left great toe distal phalanx IP joint disarticulation amputation surgery many years ago. Now with erythema proximal left great toe  Clinical presentation c/w osteomyelitis of the left great toe distal half of the proximal phalanx s/p amputation of left great toe proximal phalanx on 11/17/20. Grossly clean margins achieved at surgery. Intra op cultures: pending    preop wound culture 9/29/20- MSSA, group b strep, Enterobacter, Klebsiella status post ciprofloxacin (last dose day prior to surgery)    Recent outpatient antibiotic can interfere with current culture results. Will need to select antibiotics keeping this in mind. 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 Patient states that he had some visible capillaries noted under the skin of left leg when he took Bactrim in 2016. He denies any rash, itching, swelling of the throat or any definitive signs of allergy. Patient is willing to use Bactrim at this time. Benefits and risks of this approach including rash, hives, swelling of throat, anaphylaxis, death explained to patient in details. She verbalized her understanding and wishes to proceed. Nursing staff was advised to monitor closely for allergy. Give antihistaminics & call me if any allergic reaction noted. Recommendations:    1. D/c piperacillin/tazobactam.  Start po augmentin, bactrim (monitor for allergic reaction)  2. Follow-up Intra-Op cultures  3. Hopefully d/c patient in am on bactrim/augmentin till 11/29/20 if able to tolerate Bactrim without difficulties.   4.  Benadryl as needed    Above plan was discussed in details with patient, and dr Skylar Clark. All questions answered to their full satisfaction. Spent additional 35 minutes in management and evaluation of this patient. >50% time spent in counselling and coordination of care. Please call me if any further questions or concerns. Will continue to participate in the care of this patient. HPI:    Denies subjective fever, chills, chest pain, shortness of breath      home Medication List    Details   !! HYDROcodone-acetaminophen (NORCO) 7.5-325 mg per tablet Take 2 Tabs by mouth every six (6) hours as needed for Pain for up to 14 days. Max Daily Amount: 8 Tabs. One to two po every 4 to 6 hrs prn pain  Qty: 28 Tab, Refills: 0    Associated Diagnoses: Other chronic osteomyelitis of left foot (HCC)       Details   ciprofloxacin HCl (CIPRO) 750 mg tablet Take 1 Tab by mouth two (2) times a day. Qty: 14 Tab, Refills: 1    Associated Diagnoses: Open wound of toe, initial encounter      !! HYDROcodone-acetaminophen (NORCO) 7.5-325 mg per tablet Take 1-2 Tabs by mouth every eight (8) hours as needed for Pain (AFTER SURGERY, NOT BEFORE) for up to 7 days. Max Daily Amount: 6 Tabs. Qty: 42 Tab, Refills: 0    Associated Diagnoses: Other chronic osteomyelitis of left foot ([address] Pre-operative examination      ondansetron (ZOFRAN ODT) 4 mg disintegrating tablet Take 1 Tab by mouth every eight (8) hours as needed for Nausea or Vomiting. Indications: prevent nausea and vomiting after surgery  Qty: 30 Tab, Refills: 0      aspirin (ASPIRIN) 325 mg tablet Take 1 Tab by mouth two (2) times daily (after meals). Qty: 60 Tab, Refills: 1      polyethylene glycol (Miralax) 17 gram packet Take 1 Packet by mouth daily.   Qty: 10 Packet, Refills: 1          Current Facility-Administered Medications   Medication Dose Route Frequency    amoxicillin-clavulanate (AUGMENTIN) 875-125 mg per tablet 1 Tab  1 Tab Oral Q12H    trimethoprim-sulfamethoxazole (BACTRIM DS, SEPTRA DS) 160-800 mg per tablet 1 Tab  1 Tab Oral Q12H    diphenhydrAMINE (BENADRYL) injection 25 mg  25 mg IntraVENous Q4H PRN    ondansetron (ZOFRAN ODT) tablet 4 mg  4 mg Oral Q8H PRN    polyethylene glycol (MIRALAX) packet 17 g  17 g Oral DAILY    aspirin tablet 325 mg  325 mg Oral BIDPC    sodium chloride (NS) flush 5-40 mL  5-40 mL IntraVENous Q8H    sodium chloride (NS) flush 5-40 mL  5-40 mL IntraVENous PRN    naloxone (NARCAN) injection 0.4 mg  0.4 mg IntraVENous PRN    HYDROcodone-acetaminophen (NORCO) 7.5-325 mg per tablet 1 Tab  1 Tab Oral Q4H PRN    HYDROcodone-acetaminophen (NORCO)  mg tablet 2 Tab  2 Tab Oral Q4H PRN    ondansetron (ZOFRAN) injection 4 mg  4 mg IntraVENous Q4H PRN    lactobacillus sp. 50 billion cpu (BIO-K PLUS) capsule 1 Cap  1 Cap Oral DAILY       Allergies: Bactrim [sulfamethoprim ds]    Temp (24hrs), Av.3 °F (36.3 °C), Min:97 °F (36.1 °C), Max:97.7 °F (36.5 °C)    Visit Vitals  /71 (BP 1 Location: Left arm, BP Patient Position: At rest)   Pulse 64   Temp 97.3 °F (36.3 °C)   Resp 18   Ht 6' 2\" (1.88 m)   Wt 104.3 kg (230 lb)   SpO2 95%   BMI 29.53 kg/m²       ROS: 12 point ROS obtained in details. Pertinent positives as mentioned in HPI,   otherwise negative    Physical Exam:    General: Well developed, well nourished male sitting on the  bed AAOx3 in no acute distress. General:   awake alert and oriented   HEENT:  Normocephalic, atraumatic,  EOMI, no scleral icterus or pallor; no conjunctival hemmohage;  nasal and oral mucous are moist and without evidence of lesions. Neck supple, no bruits. Lymph Nodes:   no cervical, axillary or inguinal adenopathy   Lungs:   non-labored, bilaterally clear to auscultation- no crackles wheezes rales or rhonchi   Heart:  RRR, s1 and s2; no rubs or gallops, no edema   Abdomen:  soft, non-distended, active bowel sounds, no hepatomegaly, no splenomegaly.   Non-tender   Genitourinary:  deferred   Extremities:   no clubbing, cyanosis; no joint effusions or swelling; Full ROM of all large joints to the upper and lower extremities; muscle mass appropriate for age; left foot surgical dressing not opened   Neurologic:  No gross focal sensory abnormalities; 5/5 muscle strength to upper and lower extremities. Speech appropriate. Cranial nerves intact                        Skin:  No rash or ulcers noted   Back:  no spinal or paraspinal muscle tenderness or rigidity, no CVA tenderness     Psychiatric:  No suicidal or homicidal ideations, appropriate mood and affect         Labs: Results:   Chemistry Recent Labs     11/16/20  1447   *      K 4.0      CO2 26   BUN 13   CREA 1.08   CA 8.8   AGAP 9   BUCR 12      TP 7.5   ALB 3.5   GLOB 4.0   AGRAT 0.9      CBC w/Diff No results for input(s): WBC, RBC, HGB, HCT, PLT, GRANS, LYMPH, EOS, HGBEXT, HCTEXT, PLTEXT, HGBEXT, HCTEXT, PLTEXT in the last 72 hours.    Microbiology Recent Labs     11/17/20  1142   CULT NO GROWTH AFTER 19 HOURS  PENDING          RADIOLOGY:    All available imaging studies/reports in [facility] for this admission were reviewed        Dr. Jayshree Marquez, Infectious Disease Specialist  [phone]  November 19, 2020  8:38 AM

## 2020-11-19 NOTE — ROUTINE PROCESS
Bedside and Verbal shift change report given to Kenia Mccollum (oncoming nurse) by Mara Vega RN (offgoing nurse). Report included the following information SBAR, Kardex, Procedure Summary, Intake/Output and MAR.

## 2020-11-20 ENCOUNTER — TELEPHONE (OUTPATIENT)
Dept: ORTHOPEDIC SURGERY | Age: 50
End: 2020-11-20

## 2020-11-20 ENCOUNTER — HOME HEALTH ADMISSION (OUTPATIENT)
Dept: HOME HEALTH SERVICES | Facility: HOME HEALTH | Age: 50
End: 2020-11-20
Payer: COMMERCIAL

## 2020-11-20 VITALS
TEMPERATURE: 97.1 F | HEIGHT: 74 IN | DIASTOLIC BLOOD PRESSURE: 73 MMHG | OXYGEN SATURATION: 98 % | WEIGHT: 230 LBS | BODY MASS INDEX: 29.52 KG/M2 | RESPIRATION RATE: 16 BRPM | SYSTOLIC BLOOD PRESSURE: 121 MMHG | HEART RATE: 81 BPM

## 2020-11-20 LAB — SARS-COV-2, COV2NT: NOT DETECTED

## 2020-11-20 PROCEDURE — 74011250637 HC RX REV CODE- 250/637: Performed by: ORTHOPAEDIC SURGERY

## 2020-11-20 PROCEDURE — 74011250637 HC RX REV CODE- 250/637: Performed by: INTERNAL MEDICINE

## 2020-11-20 RX ORDER — SULFAMETHOXAZOLE AND TRIMETHOPRIM 800; 160 MG/1; MG/1
1 TABLET ORAL EVERY 12 HOURS
Qty: 18 TAB | Refills: 0 | Status: SHIPPED | OUTPATIENT
Start: 2020-11-20 | End: 2020-11-29

## 2020-11-20 RX ORDER — AMOXICILLIN AND CLAVULANATE POTASSIUM 875; 125 MG/1; MG/1
1 TABLET, FILM COATED ORAL EVERY 12 HOURS
Qty: 18 TAB | Refills: 0 | Status: SHIPPED | OUTPATIENT
Start: 2020-11-20 | End: 2020-11-29

## 2020-11-20 RX ADMIN — ASPIRIN 325 MG ORAL TABLET 325 MG: 325 PILL ORAL at 08:45

## 2020-11-20 RX ADMIN — Medication 10 ML: at 06:00

## 2020-11-20 RX ADMIN — SULFAMETHOXAZOLE AND TRIMETHOPRIM 1 TABLET: 800; 160 TABLET ORAL at 08:45

## 2020-11-20 RX ADMIN — AMOXICILLIN AND CLAVULANATE POTASSIUM 1 TABLET: 875; 125 TABLET, FILM COATED ORAL at 08:45

## 2020-11-20 RX ADMIN — HYDROCODONE BITARTRATE AND ACETAMINOPHEN 2 TABLET: 10; 325 TABLET ORAL at 07:40

## 2020-11-20 RX ADMIN — Medication 1 CAPSULE: at 08:45

## 2020-11-20 NOTE — TELEPHONE ENCOUNTER
Wili from [facility] called to schedule the patient a 10 day post-op appointment after his surgery on 11/17/20. However, the next available is not until 12/02/20. The patient is scheduled for that day but does he need to be seen sooner than that since it is 15 days post surgery? Please advise patient at [address] [place] [address] or the [facility] if this appointment needs to be moved up.

## 2020-11-20 NOTE — DISCHARGE SUMMARY
DISCHARGE SUMMARY         Patient: Krista Cotto               Sex: male          MRN: [id number]         YOB: 1970      Age:  44 y.o.          LOS: 2 days                    ADMIT DATE:     11/17/2020    DISCHARGE DATE:     11/20/2020     ADMISSION DIAGNOSIS:     OA (osteoarthritis) of foot [M19.079]    DISCHARGE ORTHOPAEDIC DIAGNOSIS:     OA (osteoarthritis) of foot [M19.079]    DISCHARGE CONDITION:     GOOD    Afebrile  Ambulating  Eating, Drinking, Voiding  Stable    DISCHARGE DESTINATION:     Home with  for dressing changes      HPI:     Patient is a 44 y.o. male that has a history of open wound and osteomyelitis of the left great toe. Because of his persistent wound, and inability to heal this wound, recommendations for amputation of left great toe through the MTP or joint articulation site. Due to the current findings and affected activity of daily living surgical intervention is indicated. The alternatives, risks, complications as well as expected outcome were discussed, the patient understands and wishes to proceed with surgery. HOSPITAL COURSE:     Patient had:     Procedure(s):  AMPUTATION LEFT GREAT TOE/ PROXIMAL PHALANX     POST-OP COURSE:     The patient tolerated the procedure well and was followed by internal medicine for help with medical management. Patient was place on antibiotic both pre and post-op for prophylaxis against infection. Vitals signs closely monitored and stable at discharge. The patient remained afebrile. Patient is in no acute distress and denies any N/V/D/C, no CP/SOB, no UR/GI/ symptoms. The wound was CDI. Patient had negative calf tenderness or swelling, no evidence for DVT. Patient had PT/OT consult for evaluation and treatment. There were no transfusions with Blood products during this hospital stay.       Physical Exam on Discharge:     Vital signs at discharge:   Visit Vitals  /78 (BP 1 Location: Left arm, BP Patient Position: At rest) Pulse 72   Temp 97.1 °F (36.2 °C)   Resp 16   Ht 6' 2\" (1.88 m)   Wt 230 lb (104.3 kg)   SpO2 98%   BMI 29.53 kg/m²         Intake/Output Summary (Last 24 hours) at 11/20/2020 0912  Last data filed at 11/20/2020 0741  Gross per 24 hour   Intake 1440 ml   Output 650 ml   Net 790 ml         speech normal in context and clarity  memory intact grossly  Alert, Oriented x 3     CHEST/ABDOMEN: Observation reveals: No audible wheezing from mouth. No accessory use of chest muscles during breathing. Non tender abdomen    Examination of LLE reveals wound covered. Incision/Dressings:  Dressings are clean, dry and Intact. Extremities:        No significant edema or embolic phenomena to the foot/toes or hands                               cap refill < 2 seconds. Sensory Motor, NV grossly intact          Lower extremities are warm and appear well perfused          Neg calf tenderness nor evidence DVT                                CONSULTS:     None    SIGNIFICANT DIAGNOSTIC STUDIES:     Labs:    Basic Metabolic Profile   No results for input(s): NA, POTASSIUM, CHLORIDE, CO2, BUN in the last 72 hours. No lab exists for component: CREAT, GLUCOSE, CALCIUM, MAGNESIUM, PHOSPHORUS     Hepatic Function   No results for input(s): ALBUMIN in the last 72 hours. No lab exists for component: TOTPR, BILID, BILIT, SGPTALT, SGOTAST, ALKPHOS, AMYLASE, LIPASE      CBC w/Diff    No results for input(s): WBC, RBC, HCT, MCV, MCH, MCHC, RDW, HCTEXT in the last 72 hours. No lab exists for component: HEMOGLOBIN, PLATELET, MPV No results for input(s): BANDS, LYMPHOCYTES, MONOS, EOS, BASOS, PROMYELOCYTE, BLAST, RDW in the last 72 hours. No lab exists for component: RELNEU, RELLYM, RELMONO, RELEOS, RELBAS, SEGS, MYELOCYTE, META      Coagulation   No results for input(s): INR, APTT, INREXT in the last 72 hours.     No lab exists for component: PT          CHRONIC MEDICAL DIAGNOSES:     Problem List as of 11/20/2020 Date Reviewed: 11/17/2020          Codes Class Noted - Resolved    OA (osteoarthritis) of foot ICD-10-CM: M19.079  ICD-9-CM: 715.97  11/18/2020 - Present        Open wound of foot except toe(s) alone, without mention of complication [id number]: N15.209L  ICD-9-CM: 892.0  4/14/2015 - Present        * (Principal) Osteomyelitis ([facility] ICD-10-CM: M86.9  ICD-9-CM: 730.20  4/14/2015 - Present        Great toe amputation status ICD-10-CM: [id number]  ICD-9-CM: JSO5111  4/14/2015 - Present                DISCHARGE MEDICATIONS:     Current Discharge Medication List      START taking these medications    Details   !! HYDROcodone-acetaminophen (NORCO) 7.5-325 mg per tablet Take 2 Tabs by mouth every six (6) hours as needed for Pain for up to 14 days. Max Daily Amount: 8 Tabs. One to two po every 4 to 6 hrs prn pain  Qty: 28 Tab, Refills: 0    Associated Diagnoses: Other chronic osteomyelitis of left foot ([facility]       ! ! - Potential duplicate medications found. Please discuss with provider. CONTINUE these medications which have NOT CHANGED    Details   !! HYDROcodone-acetaminophen (NORCO) 7.5-325 mg per tablet Take 1-2 Tabs by mouth every eight (8) hours as needed for Pain (AFTER SURGERY, NOT BEFORE) for up to 7 days. Max Daily Amount: 6 Tabs. Qty: 42 Tab, Refills: 0    Associated Diagnoses: Other chronic osteomyelitis of left foot ([facility] Pre-operative examination      ondansetron (ZOFRAN ODT) 4 mg disintegrating tablet Take 1 Tab by mouth every eight (8) hours as needed for Nausea or Vomiting. Indications: prevent nausea and vomiting after surgery  Qty: 30 Tab, Refills: 0      aspirin (ASPIRIN) 325 mg tablet Take 1 Tab by mouth two (2) times daily (after meals). Qty: 60 Tab, Refills: 1      polyethylene glycol (Miralax) 17 gram packet Take 1 Packet by mouth daily. Qty: 10 Packet, Refills: 1       !! - Potential duplicate medications found. Please discuss with provider.       STOP taking these medications       naproxen sodium (Aleve) 220 mg tablet Comments:   Reason for Stopping:         ciprofloxacin HCl (CIPRO) 750 mg tablet Comments:   Reason for Stopping:         silver-foam bandage (AQUACEL AG FOAM) 1.2 %- 3.2\" X 3.2\" bndg Comments:   Reason for Stopping:               · It is important that you take the medication exactly as they are prescribed. · Keep your medication in the bottles provided by the pharmacist and keep a list of the medication names, dosages, and times to be taken in your wallet. · Do not take other medications without consulting your doctor. · Avoid constipation associated with Pain medication use by taking OTC Colace, Metamucil, Miralax or Milk of Magnesia    DISCHARGE PLAN:       The patient will be discharged to Home with home health. NWB left lower extremity. DIET:  Resume prior unless directed otherwise    NUTRITION: OTC Nutritional supplements, multivitamins, calcium with Vitamin  D    ACTIVITY: No lifting, Driving, or Strenuous exercise and No heavy lifting, pushing, pulling. Weight Bearing/Range of Motion Restrictions: NWB to the affected extremity    INCISION CARE: Keep wound clean and dry, reinforce dressing as needee and Ice to area for comfort. Keep the current dressings on and in place. There is no need to change these current dressings. Dressings to please be changed by home nurse or nursing home staff as instructed. Keep all pets away from  any wound present in order to prevent infection. PAIN CONTROL: Prescriptions: On chart - Norco    PRECAUTIONS:  No lifting, twisting, squatting, deep bending. Elevate the affected extremity on 1 pillow. Place the pillow horizontal so that no pressure is on the back of your heel.      ANTIBIOTICS:  Augmentin to be prescribed by Dr. Adan Vega:       Please call [phone] ([address]) if any: fever > 101.5 , shakes, chills, nausea, vomiting, falling, intractable pain, or for any questions you have regarding your care/medical condition. If you experience any calf pain or swelling, or are having any shortness of breath, chest pain, or extremity swelling: Please go to closest ER ASAP for assessment to rule out a leg clot. Please contact your Primary Care Physician for all preoperative medication directions, including the above medications. FOLLOW UP CARE:     You are to call your primary care provider and set up an appointment to see them in 2 weeks. Follow-up in the office with Dr. Jackie Beth. Carrie Mora or Jr Parry PA-C in 10 days. Please call [phone] to confirm your appointment. Call with any questions or concerns. Citlalli Egan, DORON, PALeiaC  [facility]  [phone]

## 2020-11-20 NOTE — PROGRESS NOTES
Reason for Admission:  OA (osteoarthritis) of foot [M19.079]                 RUR Score:    5%            Plan for utilizing home health:    Yes, freedom of choice signed for [facility]                      Likelihood of Readmission:   LOW                         Transition of Care Plan:              Initial assessment completed with patient. Cognitive status of patient: oriented to time, place, person and situation. Face sheet information confirmed:  yes. The patient designates wife, Braden Sidhu to participate in his discharge plan and to receive any needed information. This patient lives in a single family home with spouse. Patient is able to navigate steps as needed. Prior to hospitalization, patient was considered to be independent with ADLs/IADLS : yes . Patient has a current ACP document on file: no  The spouse will be available to transport patient home upon discharge. The patient has no DME in the home. Patient is not currently active with home health. Patient has not stayed in a skilled nursing facility or rehab. This patient is on dialysis :no    List of available Home Health agencies were provided and reviewed with the patient prior to discharge. Freedom of choice signed: yes, for [facility]. Currently, the discharge plan is Home with [address] [place] [address] Ilia Nicholson. The patient states that he can obtain his medications from the pharmacy, and take his medications as directed.     Patient's current insurance is [employer]       Care Management Interventions  PCP Verified by CM: No(has someone set up but cant remember name)  Mode of Transport at Discharge: Self  Transition of Care Consult (CM Consult): Discharge Planning, [address]: Yes  Current Support Network: Lives with Spouse  Confirm Follow Up Transport: Family  The Patient and/or Patient Representative was Provided with a Choice of Provider and Agrees with the Discharge Plan?: Yes  Freedom of Choice List was Provided with Basic Dialogue that Supports the Patient's Individualized Plan of Care/Goals, Treatment Preferences and Shares the Quality Data Associated with the Providers?: Yes  Discharge Location  Discharge Placement: Home with home health        Aries Jaffe RN - Outcomes Manager  [phone]

## 2020-11-20 NOTE — DISCHARGE INSTRUCTIONS
ORTHOPAEDIC DISCHARGE PLAN     1. DISCHARGE DISPOSITION:  Home    2. FOLLOW UP RETURN TO OFFICE:  11/30/2020     Call [phone] to confirm your appointment to see Dr. Yaron Lopes. Yana Soto MD.   Follow up with Primary Care Provider in 7 to 10 days. 3. WEIGHT BEARING STATUS/ROM:    PARTIAL  WEIGHT BEARING TO   to the Left LOWER EXTREMITY    4. ELEVATE the Left lower extremity on 1 pillow. Place the pillow horizontal so that no pressure is on the back of your heel    Please leave your current dressings and in place. Keep your dressings clean and dry to the: Left lower extremity      Please call [phone] ([address]) if any: fever, shakes, chills, intractable pain, or for any questions you have regarding your care/medical condition   1. If you experience any calf pain or swelling, or are having any shortness of  breath, chest pain, or extremity swelling, or bleeding thru any surgical dressings,  or Bleeding at any body location while you are taking on any blood thinners. ie (mouth,nose, skin sites:)   2. Please go to closest ER  ASAP for assessment to rule out a leg clot and to  assess any  bleeding. 3. After general anesthesia or intravenous sedation, for 24 hours or while taking  prescription Narcotics:      [x]  Limit your activities     [x]   Do not drive and operate hazardous machinery    [x]   Do not make important personal or business decisions    [x]   Do  not drink alcoholic beverages    [x]  If you have not urinated within 8 hours after discharge, please contact    your surgeon on call.      Report the following to your surgeon: If any below is true         [x]   Excessive pain, swelling, redness or odor of or around the surgical area       [x]   Temperature over 100.5       [x]  Nausea and vomiting lasting longer than 4 hours or if unable to take medications       [x]    Any signs of decreased circulation or nerve impairment to extremity:    Change in color, persistent numbness, tingling, coldness or increase pain          [x]  No smoking/ No tobacco products/ Avoid exposure to second hand smoke    5. DIET:  Regular Diet  OTC (Nutritional supplements/multivitamins/calcium w/ Vitamin  D     6. ACTIVITY:   // No lifting, twisting, squatting, deep bending. 7. INCISION CARE/DRESSINGS:   ,ACE Wraps    Keep all pets away from  any wound present in order to prevent infection. 8. PAIN CONTROL: Prescriptions written:  Norco 7.5 mg:   PO Q 4 TO 6 HOURS PRN PAIN; 30 tablets, 0 refills. Start taking your pain medications when you get home    9. VTE prophylaxis : Start  MG ONE PO EACH DAY on date // tomorrow . 10. ANTIBIOTICS: {abx:[id number]}  None at this time    11.  DME/ PRESCRIPTIONS WRITTEN ALREADY WRITTEN:     Home Care Equipment:       Crutches for home use             Jose Luis Peguero MD  11/17/2020  6:09 PM

## 2020-11-20 NOTE — PROGRESS NOTES
PROGRESS NOTE         LOS: 2 days     ASSESSMENT:      Open wound and osteomyelitis of the left great toe     3 Days Post-Op s/p:     Procedure(s):  AMPUTATION LEFT GREAT TOE/ PROXIMAL PHALANX       PLAN:       Okay to Discharge to home with home health for dressing changes  Antibiotics will be prescribed by Dr. Jesenia Og prior to discharge  Prescription for Norco on the patient chart   Follow up in the office in 10 days with Dr. Sanjuanita Harvey. Jaycob Kennedy [place], [place] [phone]      SUBJECTIVE:       Patient seen and evaluated. Doing well. No complaints resting comfortably, NAD. Denies: CP, SOB, ABD PAIN, Calf pain    OBJECTIVE:     Visit Vitals  /78 (BP 1 Location: Left arm, BP Patient Position: At rest)   Pulse 72   Temp 97.1 °F (36.2 °C)   Resp 16   Ht 6' 2\" (1.88 m)   Wt 230 lb (104.3 kg)   SpO2 98%   BMI 29.53 kg/m²       speech normal in context and clarity  memory intact grossly  Alert, Oriented x 3 in bed    CHEST/ABDOMEN: Observation reveals: No audible wheezing from mouth. No accessory use of chest muscles during breathing. Non tender abdomen    Examination of LLE reveals wound covered. Incision/Dressings:  Dressings are clean, dry and  Intact. Extremities:        No significant edema or embolic phenomena to the foot/toes or hands                               cap refill < 2 seconds.           Sensory, Motor, NV grossly intact          Lower extremities are warm and appear well perfused          Neg calf tenderness nor evidence DVT                                  Labs:    CBC  Lab Results   Component Value Date/Time    WBC 6.6 11/11/2020 10:02 AM    RBC 4.66 (L) 11/11/2020 10:02 AM    HCT 46.2 11/11/2020 10:02 AM    MCV 99.1 (H) 11/11/2020 10:02 AM    MCH 35.6 (H) 11/11/2020 10:02 AM    MCHC 35.9 11/11/2020 10:02 AM    RDW 12.5 11/11/2020 10:02 AM         Coagulation  Lab Results   Component Value Date    INR 1.0 11/11/2020    APTT 30.7 04/04/2014            Basic Metabolic Profile  Lab Results   Component Value Date     11/16/2020    CO2 26 11/16/2020    BUN 13 11/16/2020       No results found for this or any previous visit (from the past 24 hour(s)). Temp Readings from Last 3 Encounters:   11/20/20 97.1 °F (36.2 °C)   11/16/20 97.3 °F (36.3 °C) (Temporal)   11/11/20 97.3 °F (36.3 °C)         All Micro Results     Procedure Component Value Units Date/Time    CULTURE, Akash Bennett STAIN [[id number]] Collected:  11/17/20 1142    Order Status:  Completed Specimen:  Toe Updated:  11/19/20 1057     Special Requests: LEFT GREAT DEEP     GRAM STAIN RARE WBCS SEEN         NO ORGANISMS SEEN        Culture result: NO GROWTH AFTER 19 HOURS       CULTURE, ANAEROBIC [[id number]] Collected:  11/17/20 1142    Order Status:  Completed Specimen:  Toe Updated:  11/18/20 0706     Special Requests: LEFT GREAT DEEP     Culture result: PENDING    CULTURE, TISSUE Hernan Jimenez [[id number]] Collected:  11/17/20 2340    Order Status:  Canceled Specimen:  Great Toe               Citlalli James, DHSC, MPA, PALeiaC

## 2020-11-20 NOTE — PROGRESS NOTES
Bedside and Verbal shift change report given to Yesi Cordova RN (oncoming nurse) by Diana Gutierrez RN (offgoing nurse). Report included the following information SBAR and Kardex.

## 2020-11-20 NOTE — PROGRESS NOTES
Infectious Disease progress note        Reason: Left great toe infection    Current abx Prior abx   Piperacillin/tazobactam since 11/17/2020-11/19  Bactrim, Augmentin since 11/19 Ciprofloxacin     Lines:       Assessment :    H/o  left great toe distal phalanx IP joint disarticulation amputation surgery many years ago. Now with erythema proximal left great toe  Clinical presentation c/w osteomyelitis of the left great toe distal half of the proximal phalanx s/p amputation of left great toe proximal phalanx on 11/17/20. Grossly clean margins achieved at surgery. Intra op cultures: pending    preop wound culture 9/29/20- MSSA, group b strep, Enterobacter, Klebsiella status post ciprofloxacin (last dose day prior to surgery)    Recent outpatient antibiotic can interfere with current culture results. Will need to select antibiotics keeping this in mind. 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 Patient states that he had some visible capillaries noted under the skin of left leg when he took Bactrim in 2016. Currently tolerating Bactrim without difficulties. No rash or side effects noted. Recommendations:    1. Continue po augmentin, bactrim till 11/29/2020  2. DC planning per primary team    Above plan was discussed in details with patient, and dr Praneeth Hardwick. Please call me if any further questions or concerns. Will continue to participate in the care of this patient. HPI:    Denies subjective fever, chills, chest pain, shortness of breath, rash      home Medication List    Details   !! HYDROcodone-acetaminophen (NORCO) 7.5-325 mg per tablet Take 2 Tabs by mouth every six (6) hours as needed for Pain for up to 14 days. Max Daily Amount: 8 Tabs.  One to two po every 4 to 6 hrs prn pain  Qty: 28 Tab, Refills: 0    Associated Diagnoses: Other chronic osteomyelitis of left foot (HCC)       Details   ciprofloxacin HCl (CIPRO) 750 mg tablet Take 1 Tab by mouth two (2) times a day. Qty: 14 Tab, Refills: 1    Associated Diagnoses: Open wound of toe, initial encounter      !! HYDROcodone-acetaminophen (NORCO) 7.5-325 mg per tablet Take 1-2 Tabs by mouth every eight (8) hours as needed for Pain (AFTER SURGERY, NOT BEFORE) for up to 7 days. Max Daily Amount: 6 Tabs. Qty: 42 Tab, Refills: 0    Associated Diagnoses: Other chronic osteomyelitis of left foot ([address] Pre-operative examination      ondansetron (ZOFRAN ODT) 4 mg disintegrating tablet Take 1 Tab by mouth every eight (8) hours as needed for Nausea or Vomiting. Indications: prevent nausea and vomiting after surgery  Qty: 30 Tab, Refills: 0      aspirin (ASPIRIN) 325 mg tablet Take 1 Tab by mouth two (2) times daily (after meals). Qty: 60 Tab, Refills: 1      polyethylene glycol (Miralax) 17 gram packet Take 1 Packet by mouth daily.   Qty: 10 Packet, Refills: 1          Current Facility-Administered Medications   Medication Dose Route Frequency    amoxicillin-clavulanate (AUGMENTIN) 875-125 mg per tablet 1 Tab  1 Tab Oral Q12H    trimethoprim-sulfamethoxazole (BACTRIM DS, SEPTRA DS) 160-800 mg per tablet 1 Tab  1 Tab Oral Q12H    diphenhydrAMINE (BENADRYL) injection 25 mg  25 mg IntraVENous Q4H PRN    ondansetron (ZOFRAN ODT) tablet 4 mg  4 mg Oral Q8H PRN    polyethylene glycol (MIRALAX) packet 17 g  17 g Oral DAILY    aspirin tablet 325 mg  325 mg Oral BIDPC    sodium chloride (NS) flush 5-40 mL  5-40 mL IntraVENous Q8H    sodium chloride (NS) flush 5-40 mL  5-40 mL IntraVENous PRN    naloxone (NARCAN) injection 0.4 mg  0.4 mg IntraVENous PRN    HYDROcodone-acetaminophen (NORCO) 7.5-325 mg per tablet 1 Tab  1 Tab Oral Q4H PRN    HYDROcodone-acetaminophen (NORCO)  mg tablet 2 Tab  2 Tab Oral Q4H PRN    ondansetron (ZOFRAN) injection 4 mg  4 mg IntraVENous Q4H PRN    lactobacillus sp. 50 billion cpu (BIO-K PLUS) capsule 1 Cap  1 Cap Oral DAILY       Allergies: Bactrim [sulfamethoprim ds]    Temp (24hrs), Av.1 °F (36.2 °C), Min:97 °F (36.1 °C), Max:97.3 °F (36.3 °C)    Visit Vitals  /78 (BP 1 Location: Left arm, BP Patient Position: At rest)   Pulse 72   Temp 97.1 °F (36.2 °C)   Resp 16   Ht 6' 2\" (1.88 m)   Wt 104.3 kg (230 lb)   SpO2 98%   BMI 29.53 kg/m²       ROS: 12 point ROS obtained in details. Pertinent positives as mentioned in HPI,   otherwise negative    Physical Exam:    General: Well developed, well nourished male sitting on the  bed AAOx3 in no acute distress. General:   awake alert and oriented   HEENT:  Normocephalic, atraumatic,  EOMI, no scleral icterus or pallor; no conjunctival hemmohage;  nasal and oral mucous are moist and without evidence of lesions. Neck supple, no bruits. Lymph Nodes:   no cervical, axillary or inguinal adenopathy   Lungs:   non-labored, bilaterally clear to auscultation- no crackles wheezes rales or rhonchi   Heart:  RRR, s1 and s2; no rubs or gallops, no edema   Abdomen:  soft, non-distended, active bowel sounds, no hepatomegaly, no splenomegaly. Non-tender   Genitourinary:  deferred   Extremities:   no clubbing, cyanosis; no joint effusions or swelling; Full ROM of all large joints to the upper and lower extremities; muscle mass appropriate for age; left foot surgical dressing not opened   Neurologic:  No gross focal sensory abnormalities; 5/5 muscle strength to upper and lower extremities. Speech appropriate.  Cranial nerves intact                        Skin:  No rash or ulcers noted   Back:  no spinal or paraspinal muscle tenderness or rigidity, no CVA tenderness     Psychiatric:  No suicidal or homicidal ideations, appropriate mood and affect         Labs: Results:   Chemistry No results for input(s): GLU, NA, K, CL, CO2, BUN, CREA, CA, AGAP, BUCR, TBIL, AP, TP, ALB, GLOB, AGRAT in the last 72 hours. No lab exists for component: GPT   CBC w/Diff No results for input(s): WBC, RBC, HGB, HCT, PLT, GRANS, LYMPH, EOS, HGBEXT, HCTEXT, PLTEXT, HGBEXT, HCTEXT, PLTEXT in the last 72 hours.    Microbiology Recent Labs     11/17/20  1142   CULT NO GROWTH AFTER 19 HOURS  PENDING          RADIOLOGY:    All available imaging studies/reports in [facility] for this admission were reviewed        Dr. Rachel Gonzalez, Infectious Disease Specialist  [phone]  November 20, 2020  8:38 AM

## 2020-11-20 NOTE — ROUTINE PROCESS
Bedside and Verbal shift change report given to Carolina Jimenez (oncoming nurse) by Laly Artis RN (offgoing nurse). Report included the following information SBAR, Kardex, Procedure Summary, Intake/Output and MAR.

## 2020-11-20 NOTE — PROGRESS NOTES
conducted an initial consultation and Spiritual Assessment for Steffany Lackey, who is a 44 y.o.,male. Patient's Primary Language is: [place]. According to the patient's EMR Yazidi Affiliation is: No Confucianist. The reason the Patient came to the hospital is:   Patient Active Problem List    Diagnosis Date Noted    OA (osteoarthritis) of foot 11/18/2020    Open wound of foot except toe(s) alone, without mention of complication 37/93/5421    Osteomyelitis ([facility] [address] 04/14/2015    Great toe amputation status 04/14/2015        The  provided the following Interventions:  Initiated a relationship of care and support. Listened empathetically. Provided chaplaincy education. Chart reviewed. The following outcomes where achieved:  Patient shared limited information about their medical narrative  Patient processed feeling about current hospitalization. Patient expressed gratitude for 's visit. Assessment:  Patient does not have any known Yazdanism/cultural needs that will affect patient's preferences in health care. There are no known spiritual or Yazdanism issues which require intervention at this time. Plan:  Chaplains will continue to follow and will provide pastoral care on as needed and as requested.  recommends bedside caregivers page the  on duty if patient shows signs of spiritual or emotional distress. [address]  [address]   [phone]

## 2020-11-20 NOTE — TELEPHONE ENCOUNTER
Please schedule patient for post op follow up on 11/30/20. Citlalli James, [facility], MPA, PA-C  11/20/2020  11:12 AM

## 2020-11-20 NOTE — PROGRESS NOTES
Problem: Amputation  Goal: *Progressive mobility and function  Outcome: Progressing Towards Goal  Goal: *Optimal pain control at patient's stated goal  Outcome: Progressing Towards Goal  Goal: *Absence of infection signs and symptoms  Outcome: Progressing Towards Goal  Goal: *Optimal residual limb healing  Outcome: Progressing Towards Goal     Problem: Patient Education: Go to Patient Education Activity  Goal: Patient/Family Education  Outcome: Progressing Towards Goal     Problem: Falls - Risk of  Goal: *Absence of Falls  Description: Document Duglas Mcconnell Fall Risk and appropriate interventions in the flowsheet.   Outcome: Progressing Towards Goal  Note: Fall Risk Interventions:  Mobility Interventions: Patient to call before getting OOB         Medication Interventions: Patient to call before getting OOB                   Problem: Patient Education: Go to Patient Education Activity  Goal: Patient/Family Education  Outcome: Progressing Towards Goal     Problem: Pain - Acute  Goal: *Control of acute pain  Outcome: Progressing Towards Goal

## 2020-11-20 NOTE — PROGRESS NOTES
Discharge order noted for today. Pt has been accepted to [facility] agency. Met with patient and he is agreeable to the transition plan today. Transport has been arranged through his wife. Patient's discharge summary and home health  orders have been forwarded to [facility] home health  agency via [facility]. Updated bedside RN, Sherwin Orona,  to the transition plan. Discharge information has been documented on the AVS.     Rolling walker delivered to patients room. Paperwork signed and returned with necessary clinicals to [facility] office.     Nolan Tavarez RN - Outcomes Manager  [phone]

## 2020-11-20 NOTE — HOME CARE
Discharge noted for today. Received home health referral for [facility]. Spoke with patient via phone, explained services and answered all questions. Demographic verified, confirmed address: [address] [place] [address] [address] [place] [id number]. Patient will set-up his own PCP per CM.  Patient lives with Abdi Bustos (wife) and has the following DME: RW. Orders noted and arranged through central intake.      Darryle Lagos, P.NARGIS [address] Intake/Liaison

## 2020-11-20 NOTE — ROUTINE PROCESS
I have reviewed discharge instructions with the patient. The patient verbalized understanding. 
  
Discharge medications reviewed with patient and appropriate educational materials and side effects teaching were provided. 
  
Armband was removed and shredded. Pt vitals was stable and pt is cleared for discharge.

## 2020-11-21 ENCOUNTER — HOME CARE VISIT (OUTPATIENT)
Dept: SCHEDULING | Facility: HOME HEALTH | Age: 50
End: 2020-11-21
Payer: COMMERCIAL

## 2020-11-21 LAB
BACTERIA SPEC CULT: NORMAL
SERVICE CMNT-IMP: NORMAL

## 2020-11-21 PROCEDURE — G0299 HHS/HOSPICE OF RN EA 15 MIN: HCPCS

## 2020-11-21 PROCEDURE — 400013 HH SOC

## 2020-11-22 LAB
BACTERIA SPEC CULT: ABNORMAL
BACTERIA SPEC CULT: ABNORMAL
GRAM STN SPEC: ABNORMAL
GRAM STN SPEC: ABNORMAL
SERVICE CMNT-IMP: ABNORMAL

## 2020-11-23 ENCOUNTER — HOME CARE VISIT (OUTPATIENT)
Dept: HOME HEALTH SERVICES | Facility: HOME HEALTH | Age: 50
End: 2020-11-23
Payer: COMMERCIAL

## 2020-11-23 ENCOUNTER — HOME CARE VISIT (OUTPATIENT)
Dept: SCHEDULING | Facility: HOME HEALTH | Age: 50
End: 2020-11-23
Payer: COMMERCIAL

## 2020-11-23 VITALS
SYSTOLIC BLOOD PRESSURE: 137 MMHG | DIASTOLIC BLOOD PRESSURE: 78 MMHG | HEART RATE: 79 BPM | OXYGEN SATURATION: 96 % | TEMPERATURE: 97.8 F | RESPIRATION RATE: 17 BRPM

## 2020-11-23 PROCEDURE — G0300 HHS/HOSPICE OF LPN EA 15 MIN: HCPCS

## 2020-11-25 ENCOUNTER — HOME CARE VISIT (OUTPATIENT)
Dept: SCHEDULING | Facility: HOME HEALTH | Age: 50
End: 2020-11-25
Payer: COMMERCIAL

## 2020-11-25 PROCEDURE — A6446 CONFORM BAND S W>=3" <5"/YD: HCPCS

## 2020-11-25 PROCEDURE — A6260 WOUND CLEANSER ANY TYPE/SIZE: HCPCS

## 2020-11-25 PROCEDURE — A6403 STERILE GAUZE>16 <= 48 SQ IN: HCPCS

## 2020-11-25 PROCEDURE — A6216 NON-STERILE GAUZE<=16 SQ IN: HCPCS

## 2020-11-25 PROCEDURE — A6252 ABSORPT DRG >16 <=48 W/O BDR: HCPCS

## 2020-11-25 PROCEDURE — G0300 HHS/HOSPICE OF LPN EA 15 MIN: HCPCS

## 2020-11-27 ENCOUNTER — HOME CARE VISIT (OUTPATIENT)
Dept: HOME HEALTH SERVICES | Facility: HOME HEALTH | Age: 50
End: 2020-11-27
Payer: COMMERCIAL

## 2020-11-30 ENCOUNTER — HOME CARE VISIT (OUTPATIENT)
Dept: HOME HEALTH SERVICES | Facility: HOME HEALTH | Age: 50
End: 2020-11-30
Payer: COMMERCIAL

## 2020-11-30 ENCOUNTER — OFFICE VISIT (OUTPATIENT)
Dept: ORTHOPEDIC SURGERY | Age: 50
End: 2020-11-30
Payer: COMMERCIAL

## 2020-11-30 VITALS
OXYGEN SATURATION: 98 % | DIASTOLIC BLOOD PRESSURE: 85 MMHG | BODY MASS INDEX: 29.52 KG/M2 | HEIGHT: 74 IN | HEART RATE: 86 BPM | TEMPERATURE: 97.1 F | SYSTOLIC BLOOD PRESSURE: 155 MMHG | WEIGHT: 230 LBS | RESPIRATION RATE: 16 BRPM

## 2020-11-30 VITALS
OXYGEN SATURATION: 98 % | OXYGEN SATURATION: 98 % | DIASTOLIC BLOOD PRESSURE: 78 MMHG | SYSTOLIC BLOOD PRESSURE: 131 MMHG | RESPIRATION RATE: 18 BRPM | TEMPERATURE: 98 F | HEART RATE: 67 BPM | DIASTOLIC BLOOD PRESSURE: 78 MMHG | HEART RATE: 60 BPM | TEMPERATURE: 98 F | RESPIRATION RATE: 18 BRPM | SYSTOLIC BLOOD PRESSURE: 119 MMHG

## 2020-11-30 DIAGNOSIS — S91.332D PENETRATING WOUND OF LEFT FOOT, SUBSEQUENT ENCOUNTER: ICD-10-CM

## 2020-11-30 DIAGNOSIS — Z98.890 POST-OPERATIVE STATE: ICD-10-CM

## 2020-11-30 DIAGNOSIS — S91.109D OPEN WOUND OF TOE, SUBSEQUENT ENCOUNTER: ICD-10-CM

## 2020-11-30 DIAGNOSIS — M86.672 OTHER CHRONIC OSTEOMYELITIS OF LEFT FOOT (HCC): Primary | ICD-10-CM

## 2020-11-30 PROCEDURE — 99024 POSTOP FOLLOW-UP VISIT: CPT | Performed by: PHYSICIAN ASSISTANT

## 2020-11-30 RX ORDER — HYDROCODONE BITARTRATE AND ACETAMINOPHEN 7.5; 325 MG/1; MG/1
1-2 TABLET ORAL
Qty: 42 TAB | Refills: 0 | Status: SHIPPED | OUTPATIENT
Start: 2020-11-30 | End: 2020-12-07

## 2020-11-30 NOTE — PATIENT INSTRUCTIONS
· Continue activity modification · Weight bearing status:  no weight bearing to the surgical extremity · No lifting, twisting, squatting, deep bending, driving or strenuous activity. · Please follow up as instructed · Please take medication as directed · Follow RICE protocol (Rest, Ice, Compression, Elevation). Please a covering over skin for protection · Maintain proper Nutrition · Take a multivitamin, calcium + Vitamin D supplement daily (if tolerated) · If you are a current smoker, quit or limit smoking · Keep the current dressings on and in place. You need to keep this incision clean and dry. If you have a splint or cast on please keep this clean and dry as well. · You should expect swelling and bruising in the area over the next several days. You may elevate the surgical extremity on 1 pillow. Place the pillow horizontal so that no pressure is on the back of your heel. You may apply an icepack on top of the dressing to help minimize the swelling. PLEASE SEEK IMMEDIATE ASSESSMENT BY ER PHYSICIAN IF ANY OF THE FOLLOWING EXIST:  
 
? Excessive pain, swelling, redness or odor of or around the surgical area ? Temperature over 100.5 ? Nausea and vomiting lasting longer than 4 hours or if unable to take medications ? Any signs of decreased circulation or nerve impairment to extremity: change in color, persistent numbness, tingling, coldness or increase pain ? If any calf pain, calf tightness, shortness of breath, chest pain ? Any difficulty breathing at rest or with ambulation, any chest tightness/soreness ? Severe intractable pain, persistent swelling or drainage, development of a wound, incisional redness, finger/toe swelling or color changes, or CALF PAIN 
 
· Perform ankle pumps with non-surgical/non-injured extremity to help reduce the risk of blood clots · Keep all pets away from  any wound present in order to prevent infection. Acute Pain After Surgery: Care Instructions Your Care Instructions It's common to have some pain after surgery. Pain doesn't mean that something is wrong or that the surgery didn't go well. But when the pain is severe, it's important to work with your doctor to manage it. It's also important to be aware of a few facts about pain and pain medicine. · You are the only person who knows what your pain feels like. So be sure to tell your doctor when you are in pain or when the pain changes. Then he or she will know how to adjust your medicines. · Pain is often easier to control right after it starts. So it may be better to take regular doses of pain medicine and not wait until the pain gets bad. · Medicine can help control pain. But this doesn't mean you'll have no pain. Medicine works to keep the pain at a level you can live with. With time, you will feel better. Follow-up care is a key part of your treatment and safety. Be sure to make and go to all appointments, and call your doctor if you are having problems. It's also a good idea to know your test results and keep a list of the medicines you take. How can you care for yourself at home? · Be safe with medicines. Read and follow all instructions on the label. ? If the doctor gave you a prescription medicine for pain, take it as prescribed. ? If you are not taking a prescription pain medicine, ask your doctor if you can take an over-the-counter medicine. · If you take an over-the-counter pain medicine, such as acetaminophen (Tylenol), ibuprofen (Advil, Motrin), or naproxen (Aleve), read and follow all instructions on the label. · Do not take two or more pain medicines at the same time unless the doctor told you to. · Do not drink alcohol while you are taking pain medicines. · Try to walk each day if your doctor recommends it. Start by walking a little more than you did the day before.  Bit by bit, increase the amount you walk. Walking increases blood flow. It also helps prevent pneumonia and constipation. · To prevent constipation from opioid pain medicines: 
? Talk to your doctor about a laxative. ? Include fruits, vegetables, beans, and whole grains in your diet each day. These foods are high in fiber. ? Drink plenty of fluids, enough so that your urine is light yellow or clear like water. Drink water, fruit juice, or other drinks that do not contain caffeine or alcohol. If you have kidney, heart, or liver disease and have to limit fluids, talk with your doctor before you increase the amount of fluids you drink. ? Take a fiber supplement, such as Citrucel or Metamucil, every day if needed. Read and follow all instructions on the label. If you take pain medicine for more than a few days, talk to your doctor before you take fiber. When should you call for help? Call your doctor now or seek immediate medical care if: 
  · Your pain gets worse. · Your pain is not controlled by medicine. Watch closely for changes in your health, and be sure to contact your doctor if you have any problems. Where can you learn more? Go to http://www.gray.com/. Enter (23) 314-344 in the search box to learn more about \"Acute Pain After Surgery: Care Instructions. \" Current as of: September 23, 2018 Content Version: 11.9 © 4261-3981 [employer]. Care instructions adapted under license by [employer] (which disclaims liability or warranty for this information). If you have questions about a medical condition or this instruction, always ask your healthcare professional. [place] [address] any warranty or liability for your use of this information.

## 2020-11-30 NOTE — PROGRESS NOTES
Patient: Nataliia Douglass                MRN: [id number]       SSN: [id number]  YOB: 1970        AGE: 44 y.o. SEX: male    PCP:None      Chief Complaint: Surgical Follow-up (left foot. )        DOS: 11/17/2020  Amputation Left Great Toe/ Proximal Phalanx - Left    HPI:     The patient is a 44 y.o. male who presents today for follow up 13 days s/p above listed surgery. Since we last saw Nataliia Douglass in the office, the patient has been PWB to the left lower extremity. He reports some pain and sensitivity to left great to stump. Patient denies any fever, chills, chest pain, shortness of breath or calf pain. There are no new illness or injuries to report since last seen in the office. Patient is on ASA for DVT prophylaxis. Patient continues to take the ABX as prescribed by Dr. Maximo Herrera. He received  care for dressing changes. Constipation has not been a concern. There are no reported changes in medications, allergies, social or family history. Patient self-reported status is as outlined in the following pain assessment. Pain Assessment  11/30/2020   Location of Pain Foot   Pain Location Comment -   Location Modifiers Left   Severity of Pain 7   Quality of Pain Throbbing; Sharp   Quality of Pain Comment -   Duration of Pain A few minutes   Frequency of Pain Intermittent   Date Pain First Started -   Aggravating Factors Walking; Other (Comment)   Aggravating Factors Comment worse at night and in the morning. Limiting Behavior Yes   Relieving Factors Nothing   Relieving Factors Comment -   Result of Injury -   Work-Related Injury -   Type of Injury -         PHYSICAL EXAM:     Visit Vitals  BP (!) 155/85 (BP 1 Location: Right arm)   Pulse 86   Temp 97.1 °F (36.2 °C)   Resp 16   Ht 6' 2\" (1.88 m)   Wt 230 lb (104.3 kg) Comment: patient unable to apply pressure to the foot.    SpO2 98%   BMI 29.53 kg/m²         GEN:  Alert, well developed, well nourished, well appearing 44 y.o. male seated comfortably in no acute distress. Speech normal in context and clarity. Psychiatric: Affect, mood and conduct are appropriate. Alert, oriented x 3 alert, oriented x 3, memory grossly intact, no dementia      M/S EXAMINATION OF: left foot/ankle  DRESSINGS: CDI other than mild soilage on dressing   DRAINAGE: none  INCISION: Incision looks good, skin well approximated, no dehiscence, nylon sutures in place without disruption. SKIN: no edema, no erythema, no ecchymosis, no warmth    TENDERNESS:  mild tenderness to palpation and sensitivity to great to stump   NEUROVASCULAR:  grossly intact. Positive distal pulses and capillary refill. DVT ASSESSMENT:  The calf is not tender to palpation. No evidence of DVT seen on physical exam.  ROM: not tested                  RADIOGRAPHS & DIAGNOSTIC STUDIES     None    IMPRESSION:     Encounter Diagnoses     ICD-10-CM ICD-9-CM   1. [facility]  M86.672 730.17   2. Open wound of toe, subsequent encounter  S91.109D V58.89     893.0   3. Penetrating wound of left foot, subsequent encounter  S91.332D V58.89     892.0   4. Post-operative state  Z98.890 V45.89       PLAN:       Orders Placed This Encounter    HYDROcodone-acetaminophen (NORCO) 7.5-325 mg per tablet     Sig: Take 1-2 Tabs by mouth every eight (8) hours as needed for Pain for up to 7 days. Max Daily Amount: 6 Tabs. Dispense:  42 Tab     Refill:  0               Patient Instructions                 · Continue activity modification    · Weight bearing status:  no weight bearing to the surgical extremity    · No lifting, twisting, squatting, deep bending, driving or strenuous activity. · Please follow up as instructed    · Please take medication as directed    · Follow RICE protocol (Rest, Ice, Compression, Elevation).  Please a covering over skin for protection     · Maintain proper Nutrition    · Take a multivitamin, calcium + Vitamin D supplement daily (if tolerated)    · If you are a current smoker, quit or limit smoking    · Keep the current dressings on and in place. You need to keep this incision clean and dry. If you have a splint or cast on please keep this clean and dry as well. · You should expect swelling and bruising in the area over the next several days. You may elevate the surgical extremity on 1 pillow. Place the pillow horizontal so that no pressure is on the back of your heel. You may apply an icepack on top of the dressing to help minimize the swelling. PLEASE SEEK IMMEDIATE ASSESSMENT BY ER PHYSICIAN IF ANY OF THE FOLLOWING EXIST:      Excessive pain, swelling, redness or odor of or around the surgical area    Temperature over 100.5    Nausea and vomiting lasting longer than 4 hours or if unable to take medications    Any signs of decreased circulation or nerve impairment to extremity: change in color, persistent numbness, tingling, coldness or increase pain    If any calf pain, calf tightness, shortness of breath, chest pain    Any difficulty breathing at rest or with ambulation, any chest tightness/soreness   Severe intractable pain, persistent swelling or drainage, development of a wound, incisional redness, finger/toe swelling or color changes, or CALF PAIN    · Perform ankle pumps with non-surgical/non-injured extremity to help reduce the risk of blood clots    · Keep all pets away from  any wound present in order to prevent infection. Acute Pain After Surgery: Care Instructions  Your Care Instructions    It's common to have some pain after surgery. Pain doesn't mean that something is wrong or that the surgery didn't go well. But when the pain is severe, it's important to work with your doctor to manage it. It's also important to be aware of a few facts about pain and pain medicine. · You are the only person who knows what your pain feels like.  So be sure to tell your doctor when you are in pain or when the pain changes. Then he or she will know how to adjust your medicines. · Pain is often easier to control right after it starts. So it may be better to take regular doses of pain medicine and not wait until the pain gets bad. · Medicine can help control pain. But this doesn't mean you'll have no pain. Medicine works to keep the pain at a level you can live with. With time, you will feel better. Follow-up care is a key part of your treatment and safety. Be sure to make and go to all appointments, and call your doctor if you are having problems. It's also a good idea to know your test results and keep a list of the medicines you take. How can you care for yourself at home? · Be safe with medicines. Read and follow all instructions on the label. ? If the doctor gave you a prescription medicine for pain, take it as prescribed. ? If you are not taking a prescription pain medicine, ask your doctor if you can take an over-the-counter medicine. · If you take an over-the-counter pain medicine, such as acetaminophen (Tylenol), ibuprofen (Advil, Motrin), or naproxen (Aleve), read and follow all instructions on the label. · Do not take two or more pain medicines at the same time unless the doctor told you to. · Do not drink alcohol while you are taking pain medicines. · Try to walk each day if your doctor recommends it. Start by walking a little more than you did the day before. Bit by bit, increase the amount you walk. Walking increases blood flow. It also helps prevent pneumonia and constipation. · To prevent constipation from opioid pain medicines:  ? Talk to your doctor about a laxative. ? Include fruits, vegetables, beans, and whole grains in your diet each day. These foods are high in fiber. ? Drink plenty of fluids, enough so that your urine is light yellow or clear like water. Drink water, fruit juice, or other drinks that do not contain caffeine or alcohol.  If you have kidney, heart, or liver disease and have to limit fluids, talk with your doctor before you increase the amount of fluids you drink. ? Take a fiber supplement, such as Citrucel or Metamucil, every day if needed. Read and follow all instructions on the label. If you take pain medicine for more than a few days, talk to your doctor before you take fiber. When should you call for help? Call your doctor now or seek immediate medical care if:    · Your pain gets worse. · Your pain is not controlled by medicine. Watch closely for changes in your health, and be sure to contact your doctor if you have any problems. Where can you learn more? Go to http://www.gray.com/. Enter ([phone] 457-854 in the search box to learn more about \"Acute Pain After Surgery: Care Instructions. \"  Current as of: September 23, 2018  Content Version: 11.9  © 6475-5056 [email or web address]. Care instructions adapted under license by [employer] (which disclaims liability or warranty for this information). If you have questions about a medical condition or this instruction, always ask your healthcare professional. [place] [address] any warranty or liability for your use of this information. Follow-up and Dispositions  ·   Return in about 2 weeks (around 12/14/2020) for follow up evaluation, suture removal.           Please contact your primary care provider to follow-up on recommended health maintenance items          Lynda Vaughn presents today for post operative follow up and pain that is secondary to post operative state. Since surgery, the patient's pain has not changed. Patient describes the pain as aching, pulsating, throbbing. Since surgery, the patient is not able to perform normal daily activities. Patient reports the following adverse side effects from treatment: none.     Today - Pain Scale: 7/10    Prior records: N/A - post op      Dr. Clara Metcalf has been consulted during this visit and he agrees with the assessment and plan. Patient expresses understanding of the plan. All questions were answered. Patient education provided on post surgical care. Sky Felipe has been asked to contact his primary care provider to follow-up on his recommended health maintenance items.  was reviewed by Himanshu Harvey PA-C on 11/30/2020. REVIEW OF SYSTEMS:     Review of Systems: Chest pain: no  Shortness of breath: no  Fever: no  Night sweats: no  Weight loss: no  Constitutional signs: no  Review of all other systems is negative    Otherwise as noted in HPI      PAST MEDICAL HISTORY:     Past Medical History:   Diagnosis Date    [facility]        MEDICATIONS:     Current Outpatient Medications   Medication Sig    HYDROcodone-acetaminophen (NORCO) 7.5-325 mg per tablet Take 1-2 Tabs by mouth every eight (8) hours as needed for Pain for up to 7 days. Max Daily Amount: 6 Tabs.  aspirin (ASPIRIN) 325 mg tablet Take 1 Tab by mouth two (2) times daily (after meals).  ondansetron (ZOFRAN ODT) 4 mg disintegrating tablet Take 1 Tab by mouth every eight (8) hours as needed for Nausea or Vomiting. Indications: prevent nausea and vomiting after surgery (Patient not taking: Reported on 11/23/2020)    polyethylene glycol (Miralax) 17 gram packet Take 1 Packet by mouth daily. (Patient not taking: Reported on 11/23/2020)     No current facility-administered medications for this visit.         ALLERGIES:     Allergies   Allergen Reactions    Bactrim [Sulfamethoprim Ds] Rash         PAST SURGICAL HISTORY:     Past Surgical History:   Procedure Laterality Date    FOOT/TOES SURGERY PROC UNLISTED      HX HEENT  02/2020    Lip excision (lower)       SOCIAL HISTORY:     Social History     Socioeconomic History    Marital status:      Spouse name: Not on file    Number of children: Not on file    Years of education: Not on file    Highest education level: Not on file   Occupational History    Not on file   Social Needs    Financial resource strain: Not on file    Food insecurity     Worry: Not on file     Inability: Not on file    Transportation needs     Medical: Not on file     Non-medical: Not on file   Tobacco Use    Smoking status: Former Smoker    Smokeless tobacco: Never Used   Substance and Sexual Activity    Alcohol use: Yes     Comment: social    Drug use: Not Currently     Types: Marijuana     Comment: last 2005    Sexual activity: Not on file   Lifestyle    Physical activity     Days per week: Not on file     Minutes per session: Not on file    Stress: Not on file   Relationships    Social connections     Talks on phone: Not on file     Gets together: Not on file     Attends Christian service: Not on file     Active member of club or organization: Not on file     Attends meetings of clubs or organizations: Not on file     Relationship status: Not on file    Intimate partner violence     Fear of current or ex partner: Not on file     Emotionally abused: Not on file     Physically abused: Not on file     Forced sexual activity: Not on file   Other Topics Concern    Not on file   Social History Narrative    Not on file       FAMILY HISTORY:     Family History   Problem Relation Age of Onset    Cancer Father     Diabetes Brother     Heart Disease Other     Hypertension Neg Hx     Stroke Neg Hx            Citlalli ASundeep Dubon [facility], MPA, PA-C  11/30/2020       Disclaimer: Sections of this note are dictated utilizing voice recognition software, which may have resulted in some phonetic based errors in grammar and contents. Even though attempts were made to correct all the mistakes, some may have been missed, and remained in the body of the document. If questions arise, please contact our department.

## 2020-12-03 ENCOUNTER — HOME CARE VISIT (OUTPATIENT)
Dept: SCHEDULING | Facility: HOME HEALTH | Age: 50
End: 2020-12-03
Payer: COMMERCIAL

## 2020-12-03 PROCEDURE — G0300 HHS/HOSPICE OF LPN EA 15 MIN: HCPCS

## 2020-12-04 PROCEDURE — A6446 CONFORM BAND S W>=3" <5"/YD: HCPCS

## 2020-12-04 PROCEDURE — A6450 LT COMPRES BAND >=5"/YD: HCPCS

## 2020-12-04 PROCEDURE — A6402 STERILE GAUZE <= 16 SQ IN: HCPCS

## 2020-12-07 ENCOUNTER — HOME CARE VISIT (OUTPATIENT)
Dept: HOME HEALTH SERVICES | Facility: HOME HEALTH | Age: 50
End: 2020-12-07
Payer: COMMERCIAL

## 2020-12-07 PROCEDURE — G0300 HHS/HOSPICE OF LPN EA 15 MIN: HCPCS

## 2020-12-14 VITALS
OXYGEN SATURATION: 98 % | HEART RATE: 65 BPM | SYSTOLIC BLOOD PRESSURE: 120 MMHG | DIASTOLIC BLOOD PRESSURE: 76 MMHG | TEMPERATURE: 97.7 F | RESPIRATION RATE: 18 BRPM

## 2020-12-14 VITALS
SYSTOLIC BLOOD PRESSURE: 122 MMHG | RESPIRATION RATE: 18 BRPM | OXYGEN SATURATION: 98 % | TEMPERATURE: 98.1 F | DIASTOLIC BLOOD PRESSURE: 78 MMHG | HEART RATE: 76 BPM

## 2020-12-15 ENCOUNTER — OFFICE VISIT (OUTPATIENT)
Dept: ORTHOPEDIC SURGERY | Age: 50
End: 2020-12-15
Payer: COMMERCIAL

## 2020-12-15 ENCOUNTER — HOME CARE VISIT (OUTPATIENT)
Dept: HOME HEALTH SERVICES | Facility: HOME HEALTH | Age: 50
End: 2020-12-15
Payer: COMMERCIAL

## 2020-12-15 VITALS
TEMPERATURE: 96.6 F | HEART RATE: 79 BPM | SYSTOLIC BLOOD PRESSURE: 153 MMHG | OXYGEN SATURATION: 98 % | HEIGHT: 74 IN | RESPIRATION RATE: 16 BRPM | DIASTOLIC BLOOD PRESSURE: 108 MMHG | BODY MASS INDEX: 29.53 KG/M2

## 2020-12-15 DIAGNOSIS — S91.109D OPEN WOUND OF TOE, SUBSEQUENT ENCOUNTER: ICD-10-CM

## 2020-12-15 DIAGNOSIS — M86.672 OTHER CHRONIC OSTEOMYELITIS OF LEFT FOOT (HCC): Primary | ICD-10-CM

## 2020-12-15 DIAGNOSIS — S91.332D PENETRATING WOUND OF LEFT FOOT, SUBSEQUENT ENCOUNTER: ICD-10-CM

## 2020-12-15 PROCEDURE — 99024 POSTOP FOLLOW-UP VISIT: CPT | Performed by: PHYSICIAN ASSISTANT

## 2020-12-15 NOTE — PATIENT INSTRUCTIONS
? Your sutures were removed in the office today ? You may shower in 3 days, NO soaking and no submerging in ANY water (no bath tubs, pools, hot tubs, oceans, etc) ? Do not pick at your skin or the incision. Allow the incision to air when at rest (away from pets). ? Pat dry after shower, no aggressive scrubbing or manipulation of the incision ? Apply a gentle moisturizer with no fragrance (such as plain Vaseline) twice daily ? Partial weightbearing on the affected extremity as instructed Progressive weightbearing on the affected lower extremity in the CAM boot using crutches or walker. Cover incision with large band aid or clean sock when wearing CAM boot to prevent irritation or friction against the incision · Work note provided to return to work tomorrow using CAM boot

## 2020-12-15 NOTE — PROGRESS NOTES
Patient: Isidoro Lacy                MRN: [id number]       SSN: [id number]  YOB: 1970        AGE: 44 y.o. SEX: male    PCP:None      Chief Complaint: Foot Pain (left foot pain)        DOS: 11/17/2020  Amputation Left Great Toe/ Proximal Phalanx - Left    HPI:     The patient is a 44 y.o. male who presents today for follow up 28 days s/p above listed surgery. Since we last saw Isidoro Lacy in the office, the patient has been PWB to the left lower extremity. He reports less pain and sensitivity to left great to stump. Patient denies any fever, chills, chest pain, shortness of breath or calf pain. There are no new illness or injuries to report since last seen in the office. Patient is on ASA for DVT prophylaxis. Patient continues ABX and [facility] care for dressing changes. Constipation has not been a concern. There are no reported changes in medications, allergies, social or family history. Patient self-reported status is as outlined in the following pain assessment. Pain Assessment  12/15/2020   Location of Pain Foot   Pain Location Comment -   Location Modifiers Left   Severity of Pain 4   Quality of Pain Aching;Dull   Quality of Pain Comment -   Duration of Pain A few hours   Frequency of Pain Intermittent   Date Pain First Started -   Aggravating Factors -   Aggravating Factors Comment -   Limiting Behavior -   Relieving Factors Nothing   Relieving Factors Comment aspirin   Result of Injury No   Work-Related Injury -   Type of Injury -         PHYSICAL EXAM:     Visit Vitals  BP (!) 153/108 (BP 1 Location: Right arm, BP Patient Position: Sitting)   Pulse 79   Temp (!) 96.6 °F (35.9 °C) (Temporal)   Resp 16   Ht 6' 2\" (1.88 m)   SpO2 98%   BMI 29.53 kg/m²         GEN:  Alert, well developed, well nourished, well appearing 44 y.o. male seated comfortably in no acute distress. Speech normal in context and clarity.     Psychiatric: Affect, mood and conduct are appropriate. Alert, oriented x 3 alert, oriented x 3, memory grossly intact, no dementia      M/S EXAMINATION OF: left foot/ankle  DRESSINGS: CDI other than mild soilage on dressing   DRAINAGE: none  INCISION: Incision looks good, skin well approximated, no dehiscence, nylon sutures in place without disruption. SKIN: no edema, no erythema, no ecchymosis, no warmth    TENDERNESS:  mild tenderness to palpation and sensitivity to great to stump   NEUROVASCULAR:  grossly intact. Positive distal pulses and capillary refill. DVT ASSESSMENT:  The calf is not tender to palpation. No evidence of DVT seen on physical exam.  ROM: not tested                  RADIOGRAPHS & DIAGNOSTIC STUDIES     None    IMPRESSION:     Encounter Diagnoses     ICD-10-CM ICD-9-CM   1. Other [facility]  M86.672 730.17   2. Open wound of toe, subsequent encounter  S91.109D V58.89     893.0   3. Penetrating wound of left foot, subsequent encounter  S91.332D V58.89     892.0       PLAN:       No orders of the defined types were placed in this encounter. Patient Instructions                 Your sutures were removed in the office today     You may shower in 3 days, NO soaking and no submerging in ANY water (no bath tubs, pools, hot tubs, oceans, etc)     Do not pick at your skin or the incision. Allow the incision to air when at rest (away from pets).  Pat dry after shower, no aggressive scrubbing or manipulation of the incision     Apply a gentle moisturizer with no fragrance (such as plain Vaseline) twice daily      Partial weightbearing on the affected extremity as instructed    Progressive weightbearing on the affected lower extremity in the CAM boot using crutches or walker.  Cover incision with large band aid or clean sock when wearing CAM boot to prevent irritation or friction against the incision     · Work note provided to return to work tomorrow using CAM boot        Follow-up and Dispositions  ·   Return in about 4 weeks (around 1/12/2021) for follow up evaluation. Please contact your primary care provider to follow-up on recommended health maintenance items          Natalie Almanzar presents today for post operative follow up and pain that is secondary to post operative state. Since surgery, the patient's pain has not changed. Patient describes the pain as aching, pulsating, throbbing. Since surgery, the patient is not able to perform normal daily activities. Patient reports the following adverse side effects from treatment: none. Today - Pain Scale: 4/10    Prior records: N/A - post op      Dr. Jackie Gonzalez has been consulted during this visit and he agrees with the assessment and plan. Patient expresses understanding of the plan. All questions were answered. Patient education provided on post surgical care. Natalie Almanzar has been asked to contact his primary care provider to follow-up on his recommended health maintenance items.  was reviewed by Nilam Kumar PA-C on 12/15/2020. REVIEW OF SYSTEMS:     Review of Systems: Chest pain: no  Shortness of breath: no  Fever: no  Night sweats: no  Weight loss: no  Constitutional signs: no  Review of all other systems is negative    Otherwise as noted in HPI      PAST MEDICAL HISTORY:     Past Medical History:   Diagnosis Date    Left toe amputee [facility])        MEDICATIONS:     Current Outpatient Medications   Medication Sig    ondansetron (ZOFRAN ODT) 4 mg disintegrating tablet Take 1 Tab by mouth every eight (8) hours as needed for Nausea or Vomiting. Indications: prevent nausea and vomiting after surgery (Patient not taking: Reported on 11/23/2020)    aspirin (ASPIRIN) 325 mg tablet Take 1 Tab by mouth two (2) times daily (after meals).  polyethylene glycol (Miralax) 17 gram packet Take 1 Packet by mouth daily.  (Patient not taking: Reported on 11/23/2020)     No current facility-administered medications for this visit. ALLERGIES:     Allergies   Allergen Reactions    Bactrim [Sulfamethoprim Ds] Rash         PAST SURGICAL HISTORY:     Past Surgical History:   Procedure Laterality Date    FOOT/TOES SURGERY PROC UNLISTED      HX HEENT  02/2020    Lip excision (lower)       SOCIAL HISTORY:     Social History     Socioeconomic History    Marital status:      Spouse name: Not on file    Number of children: Not on file    Years of education: Not on file    Highest education level: Not on file   Occupational History    Not on file   Social Needs    Financial resource strain: Not on file    Food insecurity     Worry: Not on file     Inability: Not on file    Transportation needs     Medical: Not on file     Non-medical: Not on file   Tobacco Use    Smoking status: Former Smoker    Smokeless tobacco: Never Used   Substance and Sexual Activity    Alcohol use: Yes     Comment: social    Drug use: Not Currently     Types: Marijuana     Comment: last 2005    Sexual activity: Not on file   Lifestyle    Physical activity     Days per week: Not on file     Minutes per session: Not on file    Stress: Not on file   Relationships    Social connections     Talks on phone: Not on file     Gets together: Not on file     Attends Church service: Not on file     Active member of club or organization: Not on file     Attends meetings of clubs or organizations: Not on file     Relationship status: Not on file    Intimate partner violence     Fear of current or ex partner: Not on file     Emotionally abused: Not on file     Physically abused: Not on file     Forced sexual activity: Not on file   Other Topics Concern    Not on file   Social History Narrative    Not on file       FAMILY HISTORY:     Family History   Problem Relation Age of Onset    Cancer Father     Diabetes Brother     Heart Disease Other     Hypertension Neg Hx     Stroke Neg Hx            Citlalli Peña [facility], CORNEL SAL  12/15/2020 Disclaimer: Sections of this note are dictated utilizing voice recognition software, which may have resulted in some phonetic based errors in grammar and contents. Even though attempts were made to correct all the mistakes, some may have been missed, and remained in the body of the document. If questions arise, please contact our department.

## 2020-12-22 ENCOUNTER — HOME CARE VISIT (OUTPATIENT)
Dept: HOME HEALTH SERVICES | Facility: HOME HEALTH | Age: 50
End: 2020-12-22

## 2021-01-15 ENCOUNTER — HOME CARE VISIT (OUTPATIENT)
Dept: HOME HEALTH SERVICES | Facility: HOME HEALTH | Age: 51
End: 2021-01-15

## 2021-06-09 ENCOUNTER — OFFICE VISIT (OUTPATIENT)
Dept: ORTHOPEDIC SURGERY | Age: 51
End: 2021-06-09
Payer: COMMERCIAL

## 2021-06-09 VITALS
OXYGEN SATURATION: 98 % | HEIGHT: 74 IN | HEART RATE: 102 BPM | BODY MASS INDEX: 30.67 KG/M2 | TEMPERATURE: 96.3 F | RESPIRATION RATE: 16 BRPM | WEIGHT: 239 LBS

## 2021-06-09 DIAGNOSIS — S90.822A BLISTER OF LEFT FOOT, INITIAL ENCOUNTER: ICD-10-CM

## 2021-06-09 DIAGNOSIS — M79.672 LEFT FOOT PAIN: ICD-10-CM

## 2021-06-09 DIAGNOSIS — S91.302A OPEN WOUND OF LEFT FOOT, INITIAL ENCOUNTER: Primary | ICD-10-CM

## 2021-06-09 PROCEDURE — 73630 X-RAY EXAM OF FOOT: CPT | Performed by: PHYSICIAN ASSISTANT

## 2021-06-09 PROCEDURE — 99214 OFFICE O/P EST MOD 30 MIN: CPT | Performed by: PHYSICIAN ASSISTANT

## 2021-06-09 RX ORDER — CEPHALEXIN 500 MG/1
500 CAPSULE ORAL 4 TIMES DAILY
Qty: 28 CAPSULE | Refills: 0 | Status: SHIPPED | OUTPATIENT
Start: 2021-06-09 | End: 2021-06-16

## 2021-06-09 RX ORDER — HYDROCODONE BITARTRATE AND ACETAMINOPHEN 5; 325 MG/1; MG/1
1 TABLET ORAL
Qty: 21 TABLET | Refills: 0 | Status: SHIPPED | OUTPATIENT
Start: 2021-06-09 | End: 2021-06-16

## 2021-06-09 RX ORDER — CHOLECALCIFEROL (VITAMIN D3) 125 MCG
CAPSULE ORAL
COMMUNITY

## 2021-06-09 NOTE — PROGRESS NOTES
Araceli Montgomery (1970) is a 44 y.o. male, established patient, here for evaluation of the following chief complaint(s):    Chief Complaint   Patient presents with    Foot Pain     Left foot has a blister         SUBJECTIVE & OBJECTIVE:     HPI    Since last seen in the office, the patient reports that over the past weekend he did agree to the walk-in and his Orion Ouch sneakers. The patient states that he initially developed blisters to the plantar great toe region and dorsal foot region. The blister and sore to the plantar region of the great toe proceeded to open and drain with a loose flap of skin which was debrided in the office today by Dr. Bria Mendez. The blister to the dorsal foot remains undisrupted. The patient reports significant pain to the great toe where he has had an amputation in the past as well as the second toe which is now taking more of the load of his foot since he no longer has the distal portion of his great toe. The patient denies any new illness or injuries to report since last seen in the office. There are no reported changes in medications, allergies, social or family history. Araceli Montgomery has been experiencing pain, discomfort and associated symptoms and has tried modalities of treatment and/or self treatment confirmed as outlined in the pain assessment below. Pain Assessment  6/9/2021   Location of Pain Foot   Pain Location Comment -   Location Modifiers Left   Severity of Pain 10   Quality of Pain Sharp   Quality of Pain Comment -   Duration of Pain Persistent   Frequency of Pain Constant   Date Pain First Started -   Date Pain First Started Comment -   Aggravating Factors Walking; Other (Comment)   Aggravating Factors Comment pressure   Limiting Behavior Some   Relieving Factors Rest   Relieving Factors Comment -   Result of Injury -   Work-Related Injury -   Type of Injury -          Araceli Montgomery  has a past medical history of Left toe amputee ([facility] [address]  Other than previously noted, patient reports no changes in medications, allergies, social or family history. ASSESSMENT & PLAN:       Diagnoses and all orders for this visit:    1. Open wound of left foot, initial encounter  -     HYDROcodone-acetaminophen (NORCO) 5-325 mg per tablet; Take 1 Tablet by mouth every eight (8) hours as needed for Pain for up to 7 days. Max Daily Amount: 3 Tablets. -     AMB SUPPLY ORDER    2. Blister of left foot, initial encounter  -     HYDROcodone-acetaminophen (NORCO) 5-325 mg per tablet; Take 1 Tablet by mouth every eight (8) hours as needed for Pain for up to 7 days. Max Daily Amount: 3 Tablets. -     AMB SUPPLY ORDER    3. Left foot pain  -     AMB POC XRAY, FOOT; COMPLETE, 3+ VIEW  -     HYDROcodone-acetaminophen (NORCO) 5-325 mg per tablet; Take 1 Tablet by mouth every eight (8) hours as needed for Pain for up to 7 days. Max Daily Amount: 3 Tablets. -     AMB SUPPLY ORDER    Other orders  -     cephALEXin (KEFLEX) 500 mg capsule; Take 1 Capsule by mouth four (4) times daily for 7 days. Indications: an infection of the skin and the tissue below the skin           ICD-10-CM ICD-9-CM   1. Open wound of left foot, initial encounter  S91.302A 892.0   2. Blister of left foot, initial encounter  S90.822A 917.2   3. Left foot pain  M79.672 729.5       Orders Placed This Encounter    Generic Supply Order     Post op shoe, left  Crutches    [73519] Foot Min 3V     Order Specific Question:   Weight bearing? Answer:   No    cephALEXin (KEFLEX) 500 mg capsule     Sig: Take 1 Capsule by mouth four (4) times daily for 7 days. Indications: an infection of the skin and the tissue below the skin     Dispense:  28 Capsule     Refill:  0    HYDROcodone-acetaminophen (NORCO) 5-325 mg per tablet     Sig: Take 1 Tablet by mouth every eight (8) hours as needed for Pain for up to 7 days. Max Daily Amount: 3 Tablets.      Dispense:  21 Tablet     Refill:  0        Patient Instructions · Modify activity as instructed. Wear the post op shoe or CAM boot   · NO WEIGHT BEARING ON THE LEFT FOOT  · Keflex has been e-scribed to your [facility] pharmacy. Please take this medication as instructed  · Perform daily wound care as instructed. Keep the left foot clean and dry  · Maintain proper nutrition   · CAM boot provided  · If you are a current smoker, quit or limit smoking  · Please follow up as instructed or sooner as needed      If you have a reminder for a \"due or due soon\" health maintenance, please contact your primary care provider for follow-up on this health maintenance. Citlalli INMAN Caro [facility], CORNEL SAL  6/9/2021  11:52 AM      Follow-up and Dispositions    · Return in about 2 weeks (around 6/23/2021). Dr. Rupesh Richards has been consulted regarding the patient during this visit and he agrees with the assessment and plan    Patient expresses understanding of the diagnosis and treatment plan. Patient education has been provided. Court Patle may have a reminder for a \"due or due soon\" health maintenance. I have asked that he contact his primary care provider for follow-up on this health maintenance.  was reviewed by Rupesh Snow PA-C on 6/9/2021. PHYSICAL EXAM:        Visit Vitals  Pulse (!) 102   Temp (!) 96.3 °F (35.7 °C) (Temporal)   Resp 16   Ht 6' 2\" (1.88 m)   Wt 239 lb (108.4 kg)   SpO2 98%   BMI 30.69 kg/m²         Constitutional: [x] Alert, cooperative, appears well-developed and well-nourished [x] No apparent distress      [] Abnormal - Body mass index is 30.69 kg/m².  makes the treatment plan more complex     Mental status: [x] Alert and awake  [x] Oriented to person/place/time   [x] Normal mood/behavior/speech   [x] Normal dress/motor activity/thought processes/memory      [x] Able to follow commands    [] Abnormal - Dementia    Eyes:   EOM    [x]  Normal    [] Abnormal -   Sclera  [x]  Normal    [] Abnormal -          Discharge [x]  None visible   [] Abnormal -     HENT: [x] Normocephalic, atraumatic  [] Abnormal -   [x] Mouth/Throat: Mucous membranes are moist    External Ears [x] Normal, hearing intact  [] Abnormal -    Neck: [x] Supple. No visualized mass, normal ROM [] Abnormal -     Pulmonary/Chest: [x] Respiratory effort normal   [x] No visualized signs of difficulty breathing or respiratory distress        [] Abnormal -      Cardiovascular/    [x] Normal pulses to each foot  [] Abnormal -   Peripheral Vascular:    Neurological:        [x] No Facial Asymmetry (Cranial nerve 7 motor function) (limited exam due to video visit) [x] No gross defects         [x] No gaze palsy        [] Abnormal -          Skin:        [x] No significant exanthematous lesions or discoloration noted on facial skin or visible areas       [] Abnormal -            Psychiatric:       [x] Normal Affect, mood, judgement, behavior, conduct [] Abnormal -        [x] No Hallucinations      Musculoskeletal:   [x] Normal gait with no signs of ataxia         [x] Normal range of motion of neck        [] Abnormal -     Abdomen:             [x] NT/ND, BS++-     [] Abnormal -       MUSCULOSKELETAL: EXAMINATION OF:     ANKLE/FOOT left     Gait: antalgic   Tenderness: Moderate tenderness to the great toe amputation stub, second toe, proximal phalanx  first metatarsal  forefoot. Cutaneous: Mild swelling to the great toe stub and second toe. There is an open wound to the plantar first metatarsal region with a tissue flap present. There is an undisrupted blister to the dorsal foot. Otherwise, the skin is intact. No rash or skin lesions. No warmth, erythema or ecchymosis. No signs of infection or cellulitis present. Joint Motion: Not tested. There is pain-free range of motion of adjacent joints. Negative joint effusion  Joint / Tendon Stability: No Ankle or Subtalar instability or joint laxity. No peroneal sublux ability or dislocation  Alignment: Forefoot, Midfoot, Hindfoot WNL.   Deformity: Hammer toes, great toe amputation   Neuro Motor/Sensory: NL/NL. Calf non-tender to palpation  Vascular: NL foot/ankle pulses  Lymphatics: No extremity lymphedema, No calf swelling, no tenderness to calf muscles    On this date 6/9/2021, I have spent 30 minutes reviewing previous notes, diagnostic test results, x-rays and face to face with the patient discussing the diagnosis and importance of compliance with the treatment and plan, discussing the potential for surgery, answering all questions, as well as documenting patient care on the day of the visit. An electronic signature was used to authenticate this note. -- Allan Payne. Prudence Hus [facility], DORON, PA-C  6/9/2021      Disclaimer: Sections of this note may have been dictated utilizing voice recognition software, which may have resulted in some phonetic based errors in grammar and contents. Even though attempts were made to correct all the mistakes, some may have been missed, and remained in the body of the document. If questions arise, please contact our department. REVIEW OF SYSTEMS:                  All Below are Negative except as indicated in the HPI: See HPI                Constitutional: negative for fever, chills, night sweats and unexpected weight loss and malaise/fatigue              HEENT: Negative. No hoarseness, no double vision              Respiratory: Negative.  No difficulty breathing, No SOB              Cardiovascular: negative for chest pain, claudication, BERUMEN. (-) Leg swelling               Gastrointestinal: Negative for pain, Blood in stool, incontinence, pelvic pain, N/V/C/D              Genitourinary: No saddle anesthesia, pelvic pain, blood in urine, incontinence, dysuria               Neurological: Negative for dizziness and weakness, visual changes, confusion, headaches, seizures               Phychiatric/Behavioral: Negative for depression, memory loss, substance abuse               Extremities: Negative for hair changes, rash, or skin lesion changes Hematologic: Negative for bleeding problems, bruising, pallor or swollen lymph nodes              Peripheral Vascular: No calf pain, no circulation deficits              Musculoskeletal: As per HPI above    RADIOGRAPHS & DIAGNOSTIC STUDIES        Left foot X-rays, 3 views, AP/LAT/OBL completed 6/9/2021 AT [address] [place] [address]    X-rays reveals prior great toe amputation. There is no acute fracture, dislocation or subluxation noted. Soft tissue swelling is mild. No osteolytic or osteoblastic lesions noted. Mineralization suggests no osteopenia. Degenerative changes are noted. Hammer toes. Calcified vessels are not present. I have personally reviewed the images of the above study.  The interpretation of this study is my professional opinion

## 2021-07-26 ENCOUNTER — TELEPHONE (OUTPATIENT)
Dept: ORTHOPEDIC SURGERY | Age: 51
End: 2021-07-26

## 2021-07-26 NOTE — TELEPHONE ENCOUNTER
Patient should seek treatment at Urgent care or ED in [facility]. Citlalli Willingham [facility], DORON, PA-C  7/26/2021  1:02 PM

## 2021-07-26 NOTE — TELEPHONE ENCOUNTER
Patient is currently working in [place] for at least another week. He reports his left foot has started to swell again, and he is asking if an antibiotic can be sent to a pharmacy near him. Please advise.      Patient [phone]    [place] [address]  [phone]

## 2022-03-20 PROBLEM — M19.079 OA (OSTEOARTHRITIS) OF FOOT: Status: ACTIVE | Noted: 2020-11-18

## 2023-02-04 ENCOUNTER — HOSPITAL ENCOUNTER (OUTPATIENT)
Dept: LAB | Age: 53
Discharge: HOME | End: 2023-02-04
Payer: COMMERCIAL

## 2023-02-04 ENCOUNTER — HOSPITAL ENCOUNTER (OUTPATIENT)
Dept: GENERAL RADIOLOGY | Age: 53
Discharge: HOME | End: 2023-02-04
Payer: COMMERCIAL

## 2023-02-04 DIAGNOSIS — M86.9: ICD-10-CM

## 2023-02-04 LAB
ALBUMIN SERPL-MCNC: 3.8 G/DL (ref 3.4–5)
ALBUMIN/GLOB SERPL: 1 (ref 0.8–1.7)
ALP SERPL-CCNC: 75 U/L (ref 45–117)
ALT SERPL-CCNC: 28 U/L (ref 16–61)
ANION GAP SERPL CALC-SCNC: 6 MMOL/L (ref 3–18)
AST SERPL-CCNC: 22 U/L (ref 10–38)
BASOPHILS # BLD: 0 K/UL (ref 0–0.1)
BASOPHILS NFR BLD: 0 % (ref 0–2)
BILIRUB SERPL-MCNC: 0.9 MG/DL (ref 0.2–1)
BUN SERPL-MCNC: 10 MG/DL (ref 7–18)
BUN/CREAT SERPL: 12 (ref 12–20)
CALCIUM SERPL-MCNC: 9.5 MG/DL (ref 8.5–10.1)
CHLORIDE SERPL-SCNC: 103 MMOL/L (ref 100–111)
CO2 SERPL-SCNC: 28 MMOL/L (ref 21–32)
CREAT SERPL-MCNC: 0.86 MG/DL (ref 0.6–1.3)
CRP SERPL-MCNC: 1.3 MG/DL (ref 0–0.3)
DIFFERENTIAL METHOD BLD: ABNORMAL
EOSINOPHIL # BLD: 0.1 K/UL (ref 0–0.4)
EOSINOPHIL NFR BLD: 1 % (ref 0–5)
ERYTHROCYTE [DISTWIDTH] IN BLOOD BY AUTOMATED COUNT: 12 % (ref 11.6–14.5)
ERYTHROCYTE [SEDIMENTATION RATE] IN BLOOD: 17 MM/HR (ref 0–20)
GLOBULIN SER CALC-MCNC: 3.7 G/DL (ref 2–4)
GLUCOSE SERPL-MCNC: 114 MG/DL (ref 74–99)
HBA1C MFR BLD: 4.9 % (ref 4.2–5.6)
HCT VFR BLD AUTO: 43.7 % (ref 36–48)
HGB BLD-MCNC: 15.8 G/DL (ref 13–16)
IMM GRANULOCYTES # BLD AUTO: 0 K/UL (ref 0–0.04)
IMM GRANULOCYTES NFR BLD AUTO: 0 % (ref 0–0.5)
LYMPHOCYTES # BLD: 2.1 K/UL (ref 0.9–3.6)
LYMPHOCYTES NFR BLD: 33 % (ref 21–52)
MCH RBC QN AUTO: 36.4 PG (ref 24–34)
MCHC RBC AUTO-ENTMCNC: 36.2 G/DL (ref 31–37)
MCV RBC AUTO: 100.7 FL (ref 78–100)
MONOCYTES # BLD: 0.7 K/UL (ref 0.05–1.2)
MONOCYTES NFR BLD: 11 % (ref 3–10)
NEUTS SEG # BLD: 3.4 K/UL (ref 1.8–8)
NEUTS SEG NFR BLD: 54 % (ref 40–73)
NRBC # BLD: 0 K/UL (ref 0–0.01)
NRBC BLD-RTO: 0 PER 100 WBC
PLATELET # BLD AUTO: 201 K/UL (ref 135–420)
PMV BLD AUTO: 10.2 FL (ref 9.2–11.8)
POTASSIUM SERPL-SCNC: 3.9 MMOL/L (ref 3.5–5.5)
PROT SERPL-MCNC: 7.5 G/DL (ref 6.4–8.2)
RBC # BLD AUTO: 4.34 M/UL (ref 4.35–5.65)
SODIUM SERPL-SCNC: 137 MMOL/L (ref 136–145)
WBC # BLD AUTO: 6.4 K/UL (ref 4.6–13.2)

## 2023-02-04 PROCEDURE — 83036 HEMOGLOBIN GLYCOSYLATED A1C: CPT

## 2023-02-04 PROCEDURE — 86140 C-REACTIVE PROTEIN: CPT

## 2023-02-04 PROCEDURE — 85025 COMPLETE CBC W/AUTO DIFF WBC: CPT

## 2023-02-04 PROCEDURE — 36415 COLL VENOUS BLD VENIPUNCTURE: CPT

## 2023-02-04 PROCEDURE — 80053 COMPREHEN METABOLIC PANEL: CPT

## 2023-02-04 PROCEDURE — 85652 RBC SED RATE AUTOMATED: CPT

## 2023-02-04 PROCEDURE — 73630 X-RAY EXAM OF FOOT: CPT

## 2023-02-09 ENCOUNTER — DOCUMENTATION ONLY (OUTPATIENT)
Dept: INFECTIOUS DISEASES | Age: 53
End: 2023-02-09

## 2023-02-09 NOTE — PROGRESS NOTES
This note has been imported from an outside medical record to facilitate physician to physician communication in the patient's care. Format may be different from original.       INFECTIOUS DISEASE CONSULT  HISTORY  Chief Complaint / Ofelia Moser of Presenting Problem: Right gret toe osteomyelitis  Referred by Dr. Suzanne Lopez  History Of Present Illness: Mr. Damaso Brown is a pleasant 26-year-old gentleman with a past medical history of prior injury and osteomyelitis of his left great toe who is being referred to my office by podiatry to assist in the management of a left great toe wound. He tells me that about 2 weeks ago he had started to put on a topical medication to help with nail fungus but after 1 to 2 days he remarks that his skin had burned as a result of the medication and it started to slough off. His toe then became swollen red and tender. He started to have some drainage from a small opening in the distal portion of his toe. He had gone into see Dr. Suzanne Lopez in the office and had been started on Augmentin empirically. He did have an x-ray on 2/4 which shows osteomyelitis of the distal tip of the great toe as well as soft tissue swelling. He tells me that since starting the antibiotics the swelling and redness have subsided by about 50%. He continues to walk on his foot. He daily wound care himself. He has noted that he has small amount of scant drainage on the dressings. No other fevers or chills. No nausea vomiting or diarrhea. Of note, he had a prior left toe osteomyelitis with cx + MSSA, group b strep, Enterobacter, Klebsiella in No 2020 which had required amputation> he was given bactrim + Augmentin x 5 days post operatively. Medication List: None  Allergy List: Sulfa/bactrim- tolerated prolonged course in Nov 2020  Past Medical History: Left great toe Om with prior amputation 11/17/2020  Past Surgical History: Left great toe amputation 2020  Social History: Works as a government contractor. non-smoker. No recreational drug use    Family History: Brother: diabetes    REVIEW OF SYSTEMS  General: Appetite is satisfactory. No significant weight change. Eyes: No vision changes. Ears/Nose/Mouth/Throat: No hearing change, dysphagia, or change in dental status. Respiratory: No upper respiratory infections, dyspnea, cough or wheezing. Cardiovascular: No chest pain, tightness or palpitations. Gastrointestinal: No abdominal pain, nausea, vomiting, or change in bowel habits. Genitourinary: No incontinence. No dysuria. Frequency and urgency normal. No vaginal discharge. Musculoskeletal: No falls in last 12 months  No reported redness or swelling. No muscle cramps  + Right great toe swelling  Neurological: No focal weakness. Vision channges, headaches. No numbness or tingling. No seizures. No neuropathy  Skin/Breast: No new rashes.   + redness, shallow ulcers, draiange from right toe  Psychiatric: No changes in sleep. No weight changes. No suicidal ideation. Endocrine: No heat or cold intolerance. Hematological/Lymphatic: No increased peripheral edema or swelling. No unusual bleeding or bruising. No new masses. Allergic/Immunologic: No environmental allergies. VITAL SIGNS/CONSTITUTIONAL  Pulse: 75 BPM; Blood Pressure: 146/81; O2 Saturation: 100 Room Air; Temperature: 97.4 Fahrenheit; Respiratory Rate: 14 Breaths per minute;  PHYSICAL EXAM  General: This patient is well developed and in no acute distress. Ears/Nose/Mouth/Throat: Hearing grossly intact. Pharynx is not injected. Mouth without lesions. Overall appearance normal with no scars, lesions or masses. Respiratory: Normal respiratory effort. No crackles, wheezes or ronchi. Bases clear. Cardiovascular: RRR, s1 and s2, no murmurs or gallops. No peripheral edema. Edema/Varicosities of Extremities: No edema or varicosities. Gastrointestinal: Soft, non-distended. Active bowel sounds throughout  Non-tender. No hepato-splenomegaly.   Genitourinary: Deferred  Lymphatics: No palpable adenopathy. Musculoskeletal: Digits/nails: No clubbing, cyanosis, inflammation or ischemia. Onychomycosis to nail of great toe on right   Gait/Station: Normal gait. No joint deformity, swelling, redness or muscle weakness. Full ROM of joints. No effusions noted. Muscle mass appropriate for age. Skin: Warm and dry. No rashes noted on limited exam.    Right great toe is swollen with modest amount of erythema and cellulitic changes. The distal tip has a small pinpoint opening with purulent drainage. The nail has onychomycosis and is soft. There is an ulceration on the distal tip which encompasses the entire distal surface with a clean base and good granulation tissue no sloughing. No odor  Neurological: No focal weakness. Cranial nerves grossly intact. Mentation clear. Speech clear. Gait stable. No tremors. Good historian. Psychiatric: Calm, appropriate, asks appropriate questions. LABS/RADIOLOGY/TESTS  Labs: 2/4/23 Foot x- ray  Soft tissue ulceration and erosive changes at the distal first phalanx  concerning for osteomyelitis    2/4/23 11:46  WBC: 6.4  NRBC: 0.0  RBC: 4.34 (L)  HGB: 15.8  HCT: 43.7  MCV: 100.7 (H)  MCH: 36.4 (H)  MCHC: 36.2  RDW: 12.0  PLATELET: 998  MPV: 14.9  NEUTROPHILS: 54  LYMPHOCYTES: 33  MONOCYTES: 11 (H)  EOSINOPHILS: 1  BASOPHILS: 0  IMMATURE GRANULOCYTES: 0  DF: AUTOMATED  ABSOLUTE NRBC: 0.00  ABS. NEUTROPHILS: 3.4  ABS. IMM. GRANS.: 0.0  ABS. LYMPHOCYTES: 2.1  ABS. MONOCYTES: 0.7  ABS. EOSINOPHILS: 0.1  ABS. BASOPHILS: 0.0  Sed rate, automated: 17    2/4/23 11:46  Sodium: 137  Potassium: 3.9  Chloride: 103  CO2: 28  Anion gap: 6  Glucose: 114 (H)  BUN: 10  Creatinine: 0.86  BUN/Creatinine ratio: 12  Calcium: 9.5  eGFR: >60  Bilirubin, total: 0.9  Protein, total: 7.5  Albumin: 3.8  Globulin: 3.7  A-G Ratio: 1.0  ALT: 28  AST: 22  Alk.  phosphatase: 75  C-Reactive protein: 1.3 (H)      DIAGNOSIS AND ASSESSMENT  Assessment:  CPT Codes:  [id number]  ICD Codes:  M86.9: Osteomyelitis of toe of right foot  L97.912: Non-pressure ulcer of right lower extremity with fat layer exposed  B35.1: Onychomycosis  Plan:  Return to clinic in 1 week for wound check  Labs and imaging from 2/4 reviewed with the patient. All questions answered. Levofloxacin 750 mg 1 p.o. daily we will start with 14-day course but I anticipate 6 to 8 weeks for osteomyelitis. Wound culture sent via [facility]-results should be back within 24 to 48 hours. Further adjustments of antibiotics based on these results. Continue with Augmentin for now. Continue with localized wound care as per podiatry  Recheck CBC, BMP P, ESR, CRP in 2 weeks    Advised patient that if initial rounds of antibiotics do not result in notable improvement in the next 1 to 2 weeks that he may need amputation of the distal tuft of his great toe.

## 2023-02-22 ENCOUNTER — HOSPITAL ENCOUNTER (OUTPATIENT)
Facility: HOSPITAL | Age: 53
Discharge: HOME | End: 2023-02-25
Payer: COMMERCIAL

## 2023-02-22 LAB
BUN SERPL-MCNC: 16 MG/DL (ref 7–18)
CREAT SERPL-MCNC: 1.05 MG/DL (ref 0.6–1.3)
CRP SERPL-MCNC: <0.3 MG/DL (ref 0–0.3)
ERYTHROCYTE [DISTWIDTH] IN BLOOD BY AUTOMATED COUNT: 11.9 % (ref 11.6–14.5)
ERYTHROCYTE [SEDIMENTATION RATE] IN BLOOD: 8 MM/HR (ref 0–20)
HCT VFR BLD AUTO: 43.7 % (ref 36–48)
HGB BLD-MCNC: 15.8 G/DL (ref 13–16)
MCH RBC QN AUTO: 36.1 PG (ref 24–34)
MCHC RBC AUTO-ENTMCNC: 36.2 G/DL (ref 31–37)
MCV RBC AUTO: 99.8 FL (ref 78–100)
NRBC # BLD: 0 K/UL (ref 0–0.01)
NRBC BLD-RTO: 0 PER 100 WBC
PLATELET # BLD AUTO: 175 K/UL (ref 135–420)
PMV BLD AUTO: 10.4 FL (ref 9.2–11.8)
RBC # BLD AUTO: 4.38 M/UL (ref 4.35–5.65)
WBC # BLD AUTO: 6.7 K/UL (ref 4.6–13.2)

## 2023-02-22 PROCEDURE — 86140 C-REACTIVE PROTEIN: CPT

## 2023-02-22 PROCEDURE — 85027 COMPLETE CBC AUTOMATED: CPT

## 2023-02-22 PROCEDURE — 85652 RBC SED RATE AUTOMATED: CPT

## 2023-02-22 PROCEDURE — 84520 ASSAY OF UREA NITROGEN: CPT

## 2023-02-22 PROCEDURE — 36415 COLL VENOUS BLD VENIPUNCTURE: CPT

## (undated) DEVICE — HOOD, PEEL-AWAY: Brand: FLYTE

## (undated) DEVICE — DRESSING,GAUZE,XEROFORM,CURAD,1"X8",ST: Brand: CURAD

## (undated) DEVICE — PACK PROCEDURE SURG MAJ W/ BASIN LF

## (undated) DEVICE — SWAB CULT LIQ STUART AGR AERB MOD IN BRK SGL RAYON TIP PLAS 220099] BECTON DICKINSON MICRO]

## (undated) DEVICE — CULTURETTE SGL EVAC TUBE PALL -- 100/CA

## (undated) DEVICE — KIT CLN UP BON SECOURS MARYV

## (undated) DEVICE — COVER,LIGHT HANDLE,FLX,1/PK: Brand: MEDLINE INDUSTRIES, INC.

## (undated) DEVICE — MANIFOLD SURG NEPTUNE WST MGMT

## (undated) DEVICE — TRNQT RMFG CUFF 2P 34IN W/SLV -- LAWSON OEM ITEM 338157

## (undated) DEVICE — SUTURE MCRYL SZ 3-0 L27IN ABSRB UD L19MM PS-2 3/8 CIR PRIM Y427H

## (undated) DEVICE — INTENDED FOR TISSUE SEPARATION, AND OTHER PROCEDURES THAT REQUIRE A SHARP SURGICAL BLADE TO PUNCTURE OR CUT.: Brand: BARD-PARKER SAFETY BLADES SIZE 10, STERILE

## (undated) DEVICE — DRAPE,U/SHT,SPLIT,FILM,60X84,STERILE: Brand: MEDLINE

## (undated) DEVICE — STERILE POLYISOPRENE POWDER-FREE SURGICAL GLOVES: Brand: PROTEXIS

## (undated) DEVICE — DRAPE,EXTREMITY,89X128,STERILE: Brand: MEDLINE

## (undated) DEVICE — SUT ETHLN 3-0 18IN PC5 BLK --

## (undated) DEVICE — THREE-QUARTER SHEET: Brand: CONVERTORS

## (undated) DEVICE — BANDAGE,ELASTIC,ESMARK,STERILE,4"X9',LF: Brand: MEDLINE

## (undated) DEVICE — CURITY NON-ADHERENT STRIPS: Brand: CURITY

## (undated) DEVICE — GAUZE,SPONGE,4"X4",16PLY,STRL,LF,10/TRAY: Brand: MEDLINE

## (undated) DEVICE — TRAY PREP DRY W/ PREM GLV 2 APPL 6 SPNG 2 UNDPD 1 OVERWRAP

## (undated) DEVICE — SOLUTION IV 1000ML 0.9% SOD CHL

## (undated) DEVICE — PAD,ABDOMINAL,8"X10",ST,LF: Brand: MEDLINE

## (undated) DEVICE — SHOE POSTOP M MAN 9-11 UNIV FOAM TRICOT SEMI FLX SKID

## (undated) DEVICE — PENROSE TUBING RADIOPAQUE: Brand: ARGYLE

## (undated) DEVICE — REM POLYHESIVE ADULT PATIENT RETURN ELECTRODE: Brand: VALLEYLAB